# Patient Record
Sex: FEMALE | Race: OTHER | NOT HISPANIC OR LATINO | Employment: FULL TIME | RURAL
[De-identification: names, ages, dates, MRNs, and addresses within clinical notes are randomized per-mention and may not be internally consistent; named-entity substitution may affect disease eponyms.]

---

## 2021-05-25 DIAGNOSIS — G43.911 MIGRAINE, UNSPECIFIED, INTRACTABLE, WITH STATUS MIGRAINOSUS: Primary | ICD-10-CM

## 2021-06-17 ENCOUNTER — OFFICE VISIT (OUTPATIENT)
Dept: NEUROLOGY | Facility: CLINIC | Age: 51
End: 2021-06-17
Payer: COMMERCIAL

## 2021-06-17 VITALS
WEIGHT: 197.19 LBS | DIASTOLIC BLOOD PRESSURE: 90 MMHG | HEIGHT: 66 IN | OXYGEN SATURATION: 99 % | BODY MASS INDEX: 31.69 KG/M2 | HEART RATE: 83 BPM | SYSTOLIC BLOOD PRESSURE: 126 MMHG

## 2021-06-17 DIAGNOSIS — G43.019 INTRACTABLE MIGRAINE WITHOUT AURA AND WITHOUT STATUS MIGRAINOSUS: Primary | ICD-10-CM

## 2021-06-17 DIAGNOSIS — E55.9 VITAMIN D DEFICIENCY: ICD-10-CM

## 2021-06-17 DIAGNOSIS — G47.30 SLEEP APNEA, UNSPECIFIED TYPE: ICD-10-CM

## 2021-06-17 PROCEDURE — 99203 PR OFFICE/OUTPT VISIT, NEW, LEVL III, 30-44 MIN: ICD-10-PCS | Mod: S$PBB,,, | Performed by: NURSE PRACTITIONER

## 2021-06-17 PROCEDURE — 3008F PR BODY MASS INDEX (BMI) DOCUMENTED: ICD-10-PCS | Mod: ,,, | Performed by: NURSE PRACTITIONER

## 2021-06-17 PROCEDURE — 99203 OFFICE O/P NEW LOW 30 MIN: CPT | Mod: S$PBB,,, | Performed by: NURSE PRACTITIONER

## 2021-06-17 PROCEDURE — 99215 OFFICE O/P EST HI 40 MIN: CPT | Mod: PBBFAC | Performed by: NURSE PRACTITIONER

## 2021-06-17 PROCEDURE — 3008F BODY MASS INDEX DOCD: CPT | Mod: ,,, | Performed by: NURSE PRACTITIONER

## 2021-06-17 RX ORDER — ERGOCALCIFEROL 1.25 MG/1
CAPSULE ORAL
Qty: 12 CAPSULE | Refills: 1 | Status: SHIPPED | OUTPATIENT
Start: 2021-06-17 | End: 2021-07-29 | Stop reason: SDUPTHER

## 2021-06-17 RX ORDER — AMLODIPINE BESYLATE 10 MG/1
10 TABLET ORAL DAILY
COMMUNITY
Start: 2021-06-07 | End: 2021-07-29 | Stop reason: SDUPTHER

## 2021-06-17 RX ORDER — FEXOFENADINE HCL 60 MG
60 TABLET ORAL DAILY
COMMUNITY
End: 2021-07-29 | Stop reason: SDUPTHER

## 2021-06-17 RX ORDER — ASPIRIN 81 MG/1
81 TABLET ORAL DAILY
COMMUNITY
Start: 2021-02-26 | End: 2021-10-06 | Stop reason: SDUPTHER

## 2021-06-17 RX ORDER — TOPIRAMATE 50 MG/1
TABLET, FILM COATED ORAL
COMMUNITY
Start: 2021-01-28 | End: 2021-06-17 | Stop reason: SDUPTHER

## 2021-06-17 RX ORDER — SUMATRIPTAN SUCCINATE 100 MG/1
TABLET ORAL
Qty: 12 TABLET | Refills: 3 | Status: SHIPPED | OUTPATIENT
Start: 2021-06-17 | End: 2021-09-20 | Stop reason: SDUPTHER

## 2021-06-17 RX ORDER — CETIRIZINE HYDROCHLORIDE 10 MG/1
10 TABLET ORAL DAILY
COMMUNITY
End: 2021-07-29 | Stop reason: SDUPTHER

## 2021-06-17 RX ORDER — SUMATRIPTAN 50 MG/1
TABLET, FILM COATED ORAL
COMMUNITY
Start: 2021-01-28 | End: 2021-06-17 | Stop reason: DRUGHIGH

## 2021-06-17 RX ORDER — CYCLOBENZAPRINE HCL 10 MG
10 TABLET ORAL 3 TIMES DAILY PRN
COMMUNITY
Start: 2021-04-29 | End: 2021-07-29 | Stop reason: SDUPTHER

## 2021-06-17 RX ORDER — HYDROCHLOROTHIAZIDE 12.5 MG/1
12.5 TABLET ORAL DAILY
COMMUNITY
Start: 2021-06-07 | End: 2021-07-29 | Stop reason: SDUPTHER

## 2021-06-17 RX ORDER — TOPIRAMATE 50 MG/1
TABLET, FILM COATED ORAL
Qty: 60 TABLET | Refills: 3 | Status: SHIPPED | OUTPATIENT
Start: 2021-06-17 | End: 2021-09-20 | Stop reason: DRUGHIGH

## 2021-06-17 RX ORDER — PRAVASTATIN SODIUM 10 MG/1
TABLET ORAL
COMMUNITY
Start: 2021-01-24 | End: 2021-07-29 | Stop reason: SDUPTHER

## 2021-07-06 ENCOUNTER — TELEPHONE (OUTPATIENT)
Dept: NEUROLOGY | Facility: CLINIC | Age: 51
End: 2021-07-06

## 2021-07-21 DIAGNOSIS — G47.30 SLEEP APNEA, UNSPECIFIED: Primary | ICD-10-CM

## 2021-07-29 ENCOUNTER — OFFICE VISIT (OUTPATIENT)
Dept: FAMILY MEDICINE | Facility: CLINIC | Age: 51
End: 2021-07-29
Payer: COMMERCIAL

## 2021-07-29 VITALS
SYSTOLIC BLOOD PRESSURE: 114 MMHG | WEIGHT: 200 LBS | TEMPERATURE: 97 F | HEIGHT: 66 IN | BODY MASS INDEX: 32.14 KG/M2 | DIASTOLIC BLOOD PRESSURE: 78 MMHG | HEART RATE: 87 BPM | RESPIRATION RATE: 20 BRPM

## 2021-07-29 DIAGNOSIS — I10 HYPERTENSION, UNSPECIFIED TYPE: Primary | ICD-10-CM

## 2021-07-29 DIAGNOSIS — J30.89 ALLERGIC RHINITIS DUE TO OTHER ALLERGIC TRIGGER, UNSPECIFIED SEASONALITY: ICD-10-CM

## 2021-07-29 DIAGNOSIS — E78.5 HYPERLIPIDEMIA, UNSPECIFIED HYPERLIPIDEMIA TYPE: ICD-10-CM

## 2021-07-29 DIAGNOSIS — E55.9 VITAMIN D DEFICIENCY: ICD-10-CM

## 2021-07-29 PROBLEM — J30.9 ALLERGIC RHINITIS: Status: ACTIVE | Noted: 2021-07-29

## 2021-07-29 LAB
ALBUMIN SERPL BCP-MCNC: 3.6 G/DL (ref 3.5–5)
ALBUMIN/GLOB SERPL: 0.9 {RATIO}
ALP SERPL-CCNC: 74 U/L (ref 41–108)
ALT SERPL W P-5'-P-CCNC: 52 U/L (ref 13–56)
ANION GAP SERPL CALCULATED.3IONS-SCNC: 9 MMOL/L (ref 7–16)
AST SERPL W P-5'-P-CCNC: 36 U/L (ref 15–37)
BILIRUB SERPL-MCNC: 0.1 MG/DL (ref 0–1.2)
BUN SERPL-MCNC: 15 MG/DL (ref 7–18)
BUN/CREAT SERPL: 16 (ref 6–20)
CALCIUM SERPL-MCNC: 9.3 MG/DL (ref 8.5–10.1)
CHLORIDE SERPL-SCNC: 105 MMOL/L (ref 98–107)
CHOLEST SERPL-MCNC: 174 MG/DL (ref 0–200)
CHOLEST/HDLC SERPL: 4 {RATIO}
CO2 SERPL-SCNC: 29 MMOL/L (ref 21–32)
CREAT SERPL-MCNC: 0.95 MG/DL (ref 0.55–1.02)
GLOBULIN SER-MCNC: 4.2 G/DL (ref 2–4)
GLUCOSE SERPL-MCNC: 78 MG/DL (ref 74–106)
HDLC SERPL-MCNC: 43 MG/DL (ref 40–60)
LDLC SERPL CALC-MCNC: 108 MG/DL
LDLC/HDLC SERPL: 2.5 {RATIO}
NONHDLC SERPL-MCNC: 131 MG/DL
POTASSIUM SERPL-SCNC: 3.8 MMOL/L (ref 3.5–5.1)
PROT SERPL-MCNC: 7.8 G/DL (ref 6.4–8.2)
SODIUM SERPL-SCNC: 139 MMOL/L (ref 136–145)
TRIGL SERPL-MCNC: 115 MG/DL (ref 35–150)
VLDLC SERPL-MCNC: 23 MG/DL

## 2021-07-29 PROCEDURE — 80053 COMPREHEN METABOLIC PANEL: CPT | Mod: ,,, | Performed by: CLINICAL MEDICAL LABORATORY

## 2021-07-29 PROCEDURE — 80061 LIPID PANEL: CPT | Mod: ,,, | Performed by: CLINICAL MEDICAL LABORATORY

## 2021-07-29 PROCEDURE — 3074F PR MOST RECENT SYSTOLIC BLOOD PRESSURE < 130 MM HG: ICD-10-PCS | Mod: CPTII,,, | Performed by: NURSE PRACTITIONER

## 2021-07-29 PROCEDURE — 3078F PR MOST RECENT DIASTOLIC BLOOD PRESSURE < 80 MM HG: ICD-10-PCS | Mod: CPTII,,, | Performed by: NURSE PRACTITIONER

## 2021-07-29 PROCEDURE — 3074F SYST BP LT 130 MM HG: CPT | Mod: CPTII,,, | Performed by: NURSE PRACTITIONER

## 2021-07-29 PROCEDURE — 1159F PR MEDICATION LIST DOCUMENTED IN MEDICAL RECORD: ICD-10-PCS | Mod: CPTII,,, | Performed by: NURSE PRACTITIONER

## 2021-07-29 PROCEDURE — 3008F BODY MASS INDEX DOCD: CPT | Mod: CPTII,,, | Performed by: NURSE PRACTITIONER

## 2021-07-29 PROCEDURE — 99213 OFFICE O/P EST LOW 20 MIN: CPT | Mod: ,,, | Performed by: NURSE PRACTITIONER

## 2021-07-29 PROCEDURE — 3078F DIAST BP <80 MM HG: CPT | Mod: CPTII,,, | Performed by: NURSE PRACTITIONER

## 2021-07-29 PROCEDURE — 99213 PR OFFICE/OUTPT VISIT, EST, LEVL III, 20-29 MIN: ICD-10-PCS | Mod: ,,, | Performed by: NURSE PRACTITIONER

## 2021-07-29 PROCEDURE — 3044F PR MOST RECENT HEMOGLOBIN A1C LEVEL <7.0%: ICD-10-PCS | Mod: CPTII,,, | Performed by: NURSE PRACTITIONER

## 2021-07-29 PROCEDURE — 80053 COMPREHENSIVE METABOLIC PANEL: ICD-10-PCS | Mod: ,,, | Performed by: CLINICAL MEDICAL LABORATORY

## 2021-07-29 PROCEDURE — 1159F MED LIST DOCD IN RCRD: CPT | Mod: CPTII,,, | Performed by: NURSE PRACTITIONER

## 2021-07-29 PROCEDURE — 3044F HG A1C LEVEL LT 7.0%: CPT | Mod: CPTII,,, | Performed by: NURSE PRACTITIONER

## 2021-07-29 PROCEDURE — 80061 LIPID PANEL: ICD-10-PCS | Mod: ,,, | Performed by: CLINICAL MEDICAL LABORATORY

## 2021-07-29 PROCEDURE — 3008F PR BODY MASS INDEX (BMI) DOCUMENTED: ICD-10-PCS | Mod: CPTII,,, | Performed by: NURSE PRACTITIONER

## 2021-07-29 RX ORDER — ERGOCALCIFEROL 1.25 MG/1
CAPSULE ORAL
Qty: 12 CAPSULE | Refills: 1 | Status: SHIPPED | OUTPATIENT
Start: 2021-07-29 | End: 2022-10-11 | Stop reason: SDUPTHER

## 2021-07-29 RX ORDER — FEXOFENADINE HCL 60 MG
60 TABLET ORAL DAILY
Qty: 90 TABLET | Refills: 1 | Status: SHIPPED | OUTPATIENT
Start: 2021-07-29 | End: 2022-10-11 | Stop reason: SDUPTHER

## 2021-07-29 RX ORDER — AMLODIPINE BESYLATE 10 MG/1
10 TABLET ORAL DAILY
Qty: 90 TABLET | Refills: 1 | Status: SHIPPED | OUTPATIENT
Start: 2021-07-29 | End: 2022-10-11 | Stop reason: SDUPTHER

## 2021-07-29 RX ORDER — CYCLOBENZAPRINE HCL 10 MG
10 TABLET ORAL 3 TIMES DAILY PRN
Qty: 45 TABLET | Refills: 1 | Status: SHIPPED | OUTPATIENT
Start: 2021-07-29 | End: 2023-08-15

## 2021-07-29 RX ORDER — HYDROCHLOROTHIAZIDE 12.5 MG/1
12.5 TABLET ORAL DAILY
Qty: 90 TABLET | Refills: 1 | Status: SHIPPED | OUTPATIENT
Start: 2021-07-29 | End: 2022-10-11 | Stop reason: SDUPTHER

## 2021-07-29 RX ORDER — PRAVASTATIN SODIUM 10 MG/1
10 TABLET ORAL DAILY
Qty: 90 TABLET | Refills: 1 | Status: SHIPPED | OUTPATIENT
Start: 2021-07-29 | End: 2022-10-11 | Stop reason: SDUPTHER

## 2021-07-29 RX ORDER — CETIRIZINE HYDROCHLORIDE 10 MG/1
10 TABLET ORAL DAILY
Qty: 90 TABLET | Refills: 1 | Status: SHIPPED | OUTPATIENT
Start: 2021-07-29 | End: 2022-10-11 | Stop reason: SDUPTHER

## 2021-08-11 ENCOUNTER — TELEPHONE (OUTPATIENT)
Dept: FAMILY MEDICINE | Facility: CLINIC | Age: 51
End: 2021-08-11

## 2021-08-16 ENCOUNTER — PROCEDURE VISIT (OUTPATIENT)
Dept: SLEEP MEDICINE | Facility: HOSPITAL | Age: 51
End: 2021-08-16
Payer: COMMERCIAL

## 2021-08-16 DIAGNOSIS — G47.30 SLEEP APNEA, UNSPECIFIED: ICD-10-CM

## 2021-08-16 PROCEDURE — 95806 SLEEP STUDY UNATT&RESP EFFT: CPT | Mod: PO

## 2021-08-18 DIAGNOSIS — G47.30 SLEEP APNEA, UNSPECIFIED: Primary | ICD-10-CM

## 2021-09-20 ENCOUNTER — OFFICE VISIT (OUTPATIENT)
Dept: NEUROLOGY | Facility: CLINIC | Age: 51
End: 2021-09-20
Payer: COMMERCIAL

## 2021-09-20 VITALS
WEIGHT: 194.63 LBS | HEIGHT: 65 IN | HEART RATE: 77 BPM | DIASTOLIC BLOOD PRESSURE: 84 MMHG | OXYGEN SATURATION: 98 % | SYSTOLIC BLOOD PRESSURE: 122 MMHG | BODY MASS INDEX: 32.43 KG/M2

## 2021-09-20 DIAGNOSIS — G43.019 INTRACTABLE MIGRAINE WITHOUT AURA AND WITHOUT STATUS MIGRAINOSUS: Primary | ICD-10-CM

## 2021-09-20 DIAGNOSIS — G47.39 OTHER SLEEP APNEA: ICD-10-CM

## 2021-09-20 DIAGNOSIS — E55.9 VITAMIN D DEFICIENCY: ICD-10-CM

## 2021-09-20 PROBLEM — G47.30 SLEEP APNEA: Status: RESOLVED | Noted: 2021-09-20 | Resolved: 2021-09-20

## 2021-09-20 PROBLEM — G47.30 SLEEP APNEA: Status: ACTIVE | Noted: 2021-09-20

## 2021-09-20 PROCEDURE — 3044F HG A1C LEVEL LT 7.0%: CPT | Mod: ,,, | Performed by: NURSE PRACTITIONER

## 2021-09-20 PROCEDURE — 3008F BODY MASS INDEX DOCD: CPT | Mod: ,,, | Performed by: NURSE PRACTITIONER

## 2021-09-20 PROCEDURE — 3079F DIAST BP 80-89 MM HG: CPT | Mod: ,,, | Performed by: NURSE PRACTITIONER

## 2021-09-20 PROCEDURE — 3074F SYST BP LT 130 MM HG: CPT | Mod: ,,, | Performed by: NURSE PRACTITIONER

## 2021-09-20 PROCEDURE — 1160F PR REVIEW ALL MEDS BY PRESCRIBER/CLIN PHARMACIST DOCUMENTED: ICD-10-PCS | Mod: ,,, | Performed by: NURSE PRACTITIONER

## 2021-09-20 PROCEDURE — 1159F MED LIST DOCD IN RCRD: CPT | Mod: ,,, | Performed by: NURSE PRACTITIONER

## 2021-09-20 PROCEDURE — 3066F NEPHROPATHY DOC TX: CPT | Mod: ,,, | Performed by: NURSE PRACTITIONER

## 2021-09-20 PROCEDURE — 3066F PR DOCUMENTATION OF TREATMENT FOR NEPHROPATHY: ICD-10-PCS | Mod: ,,, | Performed by: NURSE PRACTITIONER

## 2021-09-20 PROCEDURE — 1159F PR MEDICATION LIST DOCUMENTED IN MEDICAL RECORD: ICD-10-PCS | Mod: ,,, | Performed by: NURSE PRACTITIONER

## 2021-09-20 PROCEDURE — 3061F NEG MICROALBUMINURIA REV: CPT | Mod: ,,, | Performed by: NURSE PRACTITIONER

## 2021-09-20 PROCEDURE — 99214 OFFICE O/P EST MOD 30 MIN: CPT | Mod: PBBFAC | Performed by: NURSE PRACTITIONER

## 2021-09-20 PROCEDURE — 99213 OFFICE O/P EST LOW 20 MIN: CPT | Mod: S$PBB,,, | Performed by: NURSE PRACTITIONER

## 2021-09-20 PROCEDURE — 99213 PR OFFICE/OUTPT VISIT, EST, LEVL III, 20-29 MIN: ICD-10-PCS | Mod: S$PBB,,, | Performed by: NURSE PRACTITIONER

## 2021-09-20 PROCEDURE — 3074F PR MOST RECENT SYSTOLIC BLOOD PRESSURE < 130 MM HG: ICD-10-PCS | Mod: ,,, | Performed by: NURSE PRACTITIONER

## 2021-09-20 PROCEDURE — 3079F PR MOST RECENT DIASTOLIC BLOOD PRESSURE 80-89 MM HG: ICD-10-PCS | Mod: ,,, | Performed by: NURSE PRACTITIONER

## 2021-09-20 PROCEDURE — 3044F PR MOST RECENT HEMOGLOBIN A1C LEVEL <7.0%: ICD-10-PCS | Mod: ,,, | Performed by: NURSE PRACTITIONER

## 2021-09-20 PROCEDURE — 1160F RVW MEDS BY RX/DR IN RCRD: CPT | Mod: ,,, | Performed by: NURSE PRACTITIONER

## 2021-09-20 PROCEDURE — 3008F PR BODY MASS INDEX (BMI) DOCUMENTED: ICD-10-PCS | Mod: ,,, | Performed by: NURSE PRACTITIONER

## 2021-09-20 PROCEDURE — 3061F PR NEG MICROALBUMINURIA RESULT DOCUMENTED/REVIEW: ICD-10-PCS | Mod: ,,, | Performed by: NURSE PRACTITIONER

## 2021-09-20 RX ORDER — TOPIRAMATE 100 MG/1
TABLET, FILM COATED ORAL
Qty: 30 TABLET | Refills: 3 | Status: ON HOLD | OUTPATIENT
Start: 2021-09-20 | End: 2022-12-01 | Stop reason: ALTCHOICE

## 2021-09-20 RX ORDER — SUMATRIPTAN SUCCINATE 100 MG/1
TABLET ORAL
Qty: 12 TABLET | Refills: 3 | Status: ON HOLD | OUTPATIENT
Start: 2021-09-20 | End: 2022-12-01 | Stop reason: ALTCHOICE

## 2021-10-06 ENCOUNTER — OFFICE VISIT (OUTPATIENT)
Dept: FAMILY MEDICINE | Facility: CLINIC | Age: 51
End: 2021-10-06
Payer: COMMERCIAL

## 2021-10-06 VITALS
SYSTOLIC BLOOD PRESSURE: 107 MMHG | RESPIRATION RATE: 18 BRPM | BODY MASS INDEX: 32.49 KG/M2 | HEART RATE: 76 BPM | HEIGHT: 65 IN | DIASTOLIC BLOOD PRESSURE: 72 MMHG | TEMPERATURE: 97 F | WEIGHT: 195 LBS

## 2021-10-06 DIAGNOSIS — I10 HYPERTENSION, UNSPECIFIED TYPE: Primary | ICD-10-CM

## 2021-10-06 DIAGNOSIS — E78.5 HYPERLIPIDEMIA, UNSPECIFIED HYPERLIPIDEMIA TYPE: ICD-10-CM

## 2021-10-06 LAB
ALBUMIN SERPL BCP-MCNC: 3.8 G/DL (ref 3.5–5)
ALBUMIN/GLOB SERPL: 0.8 {RATIO}
ALP SERPL-CCNC: 75 U/L (ref 41–108)
ALT SERPL W P-5'-P-CCNC: 29 U/L (ref 13–56)
ANION GAP SERPL CALCULATED.3IONS-SCNC: 10 MMOL/L (ref 7–16)
AST SERPL W P-5'-P-CCNC: 19 U/L (ref 15–37)
BILIRUB SERPL-MCNC: 0.2 MG/DL (ref 0–1.2)
BUN SERPL-MCNC: 11 MG/DL (ref 7–18)
BUN/CREAT SERPL: 12 (ref 6–20)
CALCIUM SERPL-MCNC: 9.7 MG/DL (ref 8.5–10.1)
CHLORIDE SERPL-SCNC: 104 MMOL/L (ref 98–107)
CHOLEST SERPL-MCNC: 162 MG/DL (ref 0–200)
CHOLEST/HDLC SERPL: 3.4 {RATIO}
CO2 SERPL-SCNC: 30 MMOL/L (ref 21–32)
CREAT SERPL-MCNC: 0.92 MG/DL (ref 0.55–1.02)
GLOBULIN SER-MCNC: 4.8 G/DL (ref 2–4)
GLUCOSE SERPL-MCNC: 106 MG/DL (ref 74–106)
HDLC SERPL-MCNC: 48 MG/DL (ref 40–60)
LDLC SERPL CALC-MCNC: 93 MG/DL
LDLC/HDLC SERPL: 1.9 {RATIO}
NONHDLC SERPL-MCNC: 114 MG/DL
POTASSIUM SERPL-SCNC: 3.6 MMOL/L (ref 3.5–5.1)
PROT SERPL-MCNC: 8.6 G/DL (ref 6.4–8.2)
SODIUM SERPL-SCNC: 140 MMOL/L (ref 136–145)
TRIGL SERPL-MCNC: 105 MG/DL (ref 35–150)
VLDLC SERPL-MCNC: 21 MG/DL

## 2021-10-06 PROCEDURE — 3044F PR MOST RECENT HEMOGLOBIN A1C LEVEL <7.0%: ICD-10-PCS | Mod: CPTII,,, | Performed by: NURSE PRACTITIONER

## 2021-10-06 PROCEDURE — 99213 PR OFFICE/OUTPT VISIT, EST, LEVL III, 20-29 MIN: ICD-10-PCS | Mod: ,,, | Performed by: NURSE PRACTITIONER

## 2021-10-06 PROCEDURE — 3078F DIAST BP <80 MM HG: CPT | Mod: CPTII,,, | Performed by: NURSE PRACTITIONER

## 2021-10-06 PROCEDURE — 80061 LIPID PANEL: CPT | Mod: ,,, | Performed by: CLINICAL MEDICAL LABORATORY

## 2021-10-06 PROCEDURE — 3061F PR NEG MICROALBUMINURIA RESULT DOCUMENTED/REVIEW: ICD-10-PCS | Mod: CPTII,,, | Performed by: NURSE PRACTITIONER

## 2021-10-06 PROCEDURE — 3074F SYST BP LT 130 MM HG: CPT | Mod: CPTII,,, | Performed by: NURSE PRACTITIONER

## 2021-10-06 PROCEDURE — 3078F PR MOST RECENT DIASTOLIC BLOOD PRESSURE < 80 MM HG: ICD-10-PCS | Mod: CPTII,,, | Performed by: NURSE PRACTITIONER

## 2021-10-06 PROCEDURE — 3008F PR BODY MASS INDEX (BMI) DOCUMENTED: ICD-10-PCS | Mod: CPTII,,, | Performed by: NURSE PRACTITIONER

## 2021-10-06 PROCEDURE — 80053 COMPREHENSIVE METABOLIC PANEL: ICD-10-PCS | Mod: ,,, | Performed by: CLINICAL MEDICAL LABORATORY

## 2021-10-06 PROCEDURE — 3061F NEG MICROALBUMINURIA REV: CPT | Mod: CPTII,,, | Performed by: NURSE PRACTITIONER

## 2021-10-06 PROCEDURE — 99213 OFFICE O/P EST LOW 20 MIN: CPT | Mod: ,,, | Performed by: NURSE PRACTITIONER

## 2021-10-06 PROCEDURE — 3008F BODY MASS INDEX DOCD: CPT | Mod: CPTII,,, | Performed by: NURSE PRACTITIONER

## 2021-10-06 PROCEDURE — 1159F MED LIST DOCD IN RCRD: CPT | Mod: CPTII,,, | Performed by: NURSE PRACTITIONER

## 2021-10-06 PROCEDURE — 80061 LIPID PANEL: ICD-10-PCS | Mod: ,,, | Performed by: CLINICAL MEDICAL LABORATORY

## 2021-10-06 PROCEDURE — 1159F PR MEDICATION LIST DOCUMENTED IN MEDICAL RECORD: ICD-10-PCS | Mod: CPTII,,, | Performed by: NURSE PRACTITIONER

## 2021-10-06 PROCEDURE — 3074F PR MOST RECENT SYSTOLIC BLOOD PRESSURE < 130 MM HG: ICD-10-PCS | Mod: CPTII,,, | Performed by: NURSE PRACTITIONER

## 2021-10-06 PROCEDURE — 3066F NEPHROPATHY DOC TX: CPT | Mod: CPTII,,, | Performed by: NURSE PRACTITIONER

## 2021-10-06 PROCEDURE — 80053 COMPREHEN METABOLIC PANEL: CPT | Mod: ,,, | Performed by: CLINICAL MEDICAL LABORATORY

## 2021-10-06 PROCEDURE — 3066F PR DOCUMENTATION OF TREATMENT FOR NEPHROPATHY: ICD-10-PCS | Mod: CPTII,,, | Performed by: NURSE PRACTITIONER

## 2021-10-06 PROCEDURE — 3044F HG A1C LEVEL LT 7.0%: CPT | Mod: CPTII,,, | Performed by: NURSE PRACTITIONER

## 2021-10-06 RX ORDER — ASPIRIN 81 MG/1
81 TABLET ORAL DAILY
Qty: 90 TABLET | Refills: 1 | Status: SHIPPED | OUTPATIENT
Start: 2021-10-06 | End: 2022-10-11 | Stop reason: SDUPTHER

## 2021-10-12 ENCOUNTER — PROCEDURE VISIT (OUTPATIENT)
Dept: SLEEP MEDICINE | Facility: HOSPITAL | Age: 51
End: 2021-10-12
Attending: INTERNAL MEDICINE
Payer: COMMERCIAL

## 2021-10-12 DIAGNOSIS — G47.30 SLEEP APNEA, UNSPECIFIED: ICD-10-CM

## 2021-10-12 PROCEDURE — 95810 POLYSOM 6/> YRS 4/> PARAM: CPT | Mod: PO

## 2021-11-19 LAB — PAP RECOMMENDATION EXT: NORMAL

## 2022-02-01 ENCOUNTER — PATIENT MESSAGE (OUTPATIENT)
Dept: FAMILY MEDICINE | Facility: CLINIC | Age: 52
End: 2022-02-01
Payer: COMMERCIAL

## 2022-02-01 DIAGNOSIS — Z12.11 SCREENING FOR COLON CANCER: Primary | ICD-10-CM

## 2022-02-09 ENCOUNTER — PATIENT OUTREACH (OUTPATIENT)
Dept: ADMINISTRATIVE | Facility: HOSPITAL | Age: 52
End: 2022-02-09

## 2022-02-21 ENCOUNTER — PATIENT MESSAGE (OUTPATIENT)
Dept: FAMILY MEDICINE | Facility: CLINIC | Age: 52
End: 2022-02-21
Payer: COMMERCIAL

## 2022-03-06 ENCOUNTER — PATIENT MESSAGE (OUTPATIENT)
Dept: FAMILY MEDICINE | Facility: CLINIC | Age: 52
End: 2022-03-06
Payer: COMMERCIAL

## 2022-03-06 DIAGNOSIS — Z12.11 SCREENING FOR COLON CANCER: Primary | ICD-10-CM

## 2022-03-20 ENCOUNTER — PATIENT MESSAGE (OUTPATIENT)
Dept: FAMILY MEDICINE | Facility: CLINIC | Age: 52
End: 2022-03-20
Payer: COMMERCIAL

## 2022-03-20 DIAGNOSIS — Z12.39 ENCOUNTER FOR SCREENING FOR MALIGNANT NEOPLASM OF BREAST, UNSPECIFIED SCREENING MODALITY: Primary | ICD-10-CM

## 2022-03-31 ENCOUNTER — TELEPHONE (OUTPATIENT)
Dept: FAMILY MEDICINE | Facility: CLINIC | Age: 52
End: 2022-03-31
Payer: COMMERCIAL

## 2022-03-31 NOTE — TELEPHONE ENCOUNTER
----- Message from Brigid Dowell sent at 3/31/2022 12:29 PM CDT -----    ----- Message -----  From: TAMMY Coleman  Sent: 3/31/2022  12:23 PM CDT  To: Brigid Dowell    Please send to [facility] or [email or web address], they do referrals  ----- Message -----  From: Brigid Dowell  Sent: 3/29/2022   1:40 PM CDT  To: Juan C Marte RN    Need's a referral fax because there wasn't a reason listed on the referral as to what her appointment was for.

## 2022-04-01 LAB — BCS RECOMMENDATION EXT: NORMAL

## 2022-04-06 ENCOUNTER — PATIENT OUTREACH (OUTPATIENT)
Dept: FAMILY MEDICINE | Facility: CLINIC | Age: 52
End: 2022-04-06
Payer: COMMERCIAL

## 2022-05-19 ENCOUNTER — PATIENT OUTREACH (OUTPATIENT)
Dept: FAMILY MEDICINE | Facility: CLINIC | Age: 52
End: 2022-05-19
Payer: COMMERCIAL

## 2022-10-07 NOTE — PROGRESS NOTES
Sravani Montague NP   [address] [place] [address]     PATIENT NAME: Sandra Pérez  : 1970  DATE: 10/11/22  MRN: [id number]      Billing Provider: Sravani Montague NP  Level of Service: OR OFFICE/OUTPT VISIT, EST, LEVL III, 20-29 MIN  Patient PCP Information       Provider PCP Type    TAMMY Coleman General            Reason for Visit / Chief Complaint: Hypertension       Update PCP  Update Chief Complaint         History of Present Illness / Problem Focused Workflow     Sandra Pérez presents to the clinic with Hypertension     Patient is a 52 year old female to the clinic for annual exam. She is due labs and medication refills. Mammogram is UTD 2022; PAP is UTD 2021. Routine Eye Exam [facility] 2022.     She reports she has been out of her Amlodipine and HCTZ for the past 1-2 weeks. She also states she has been taking her BP medications every other day instead of daily since being prescribed these medications. Refilled medications and encouraged her to take daily and recheck BP in one week.     Hypertension  This is a recurrent problem. The current episode started more than 1 year ago. The problem is controlled. Associated symptoms include headaches. Pertinent negatives include no anxiety, blurred vision, chest pain, malaise/fatigue, neck pain, orthopnea, palpitations, peripheral edema, PND, shortness of breath or sweats. There are no associated agents to hypertension. There are no known risk factors for coronary artery disease. Past treatments include nothing. The current treatment provides mild improvement. Compliance problems include diet and exercise.      Review of Systems     Review of Systems   Constitutional:  Negative for malaise/fatigue.   Eyes:  Negative for blurred vision.   Respiratory:  Negative for shortness of breath.    Cardiovascular:  Negative for chest pain, palpitations, orthopnea and PND.   Musculoskeletal:   Negative for neck pain.   Neurological:  Positive for headaches.   All other systems reviewed and are negative.     Medical / Social / Family History     Past Medical History:   Diagnosis Date    Hyperlipemia     Hypertension     Pulmonary edema        Past Surgical History:   Procedure Laterality Date    BILATERAL TUBAL LIGATION      BUNIONECTOMY      CHOLECYSTECTOMY         Social History  Ms.  reports that she has never smoked. She has never used smokeless tobacco. She reports current alcohol use. She reports that she does not use drugs.    Family History  Ms.'s family history includes Diabetes in her mother; Hypertension in her mother and sister; No Known Problems in her father.    Medications and Allergies     Medications  Outpatient Medications Marked as Taking for the 10/11/22 encounter (Office Visit) with Sravani oMntague NP   Medication Sig Dispense Refill    cyclobenzaprine (FLEXERIL) 10 MG tablet Take 1 tablet (10 mg total) by mouth 3 (three) times daily as needed. 45 tablet 1    [DISCONTINUED] amLODIPine (NORVASC) 10 MG tablet Take 1 tablet (10 mg total) by mouth once daily. 90 tablet 1    [DISCONTINUED] aspirin (ECOTRIN) 81 MG EC tablet Take 1 tablet (81 mg total) by mouth once daily. 90 tablet 1    [DISCONTINUED] cetirizine (ZYRTEC) 10 MG tablet Take 1 tablet (10 mg total) by mouth once daily. 90 tablet 1    [DISCONTINUED] ergocalciferol (ERGOCALCIFEROL) 50,000 unit Cap Take one capsule weekly for 12 weeks then reduce to one capsule monthly 12 capsule 1    [DISCONTINUED] fexofenadine (ALLEGRA ALLERGY) 60 MG tablet Take 1 tablet (60 mg total) by mouth once daily. 90 tablet 1    [DISCONTINUED] fluticasone propionate (FLONASE) 50 mcg/actuation nasal spray 2 sprays by Each Nostril route 2 (two) times daily.      [DISCONTINUED] hydroCHLOROthiazide (HYDRODIURIL) 12.5 MG Tab Take 1 tablet (12.5 mg total) by mouth once daily. 90 tablet 1    [DISCONTINUED] pravastatin (PRAVACHOL) 10 MG tablet Take 1  "tablet (10 mg total) by mouth once daily. 90 tablet 1       Allergies  Review of patient's allergies indicates:   Allergen Reactions    Shellfish containing products Edema    Lortab [hydrocodone-acetaminophen] Nausea And Vomiting       Physical Examination   BP (!) 162/110 (BP Location: Left arm, Patient Position: Sitting, BP Method: Medium (Automatic))   Pulse 94   Temp 97.7 °F (36.5 °C) (Temporal)   Resp 18   Ht 5' 5" (1.651 m)   Wt 88.4 kg (194 lb 12.8 oz)   BMI 32.42 kg/m²    Physical Exam  Vitals and nursing note reviewed.   Constitutional:       Appearance: Normal appearance.   HENT:      Head: Normocephalic.      Right Ear: Tympanic membrane normal.      Left Ear: Tympanic membrane normal.      Nose: Nose normal.      Mouth/Throat:      Mouth: Mucous membranes are moist.      Pharynx: Oropharynx is clear.   Eyes:      Conjunctiva/sclera: Conjunctivae normal.      Pupils: Pupils are equal, round, and reactive to light.   Cardiovascular:      Rate and Rhythm: Normal rate and regular rhythm.      Pulses: Normal pulses.      Heart sounds: Normal heart sounds.   Pulmonary:      Effort: Pulmonary effort is normal.      Breath sounds: Normal breath sounds.   Abdominal:      General: Bowel sounds are normal.      Palpations: Abdomen is soft.   Musculoskeletal:         General: Normal range of motion.      Cervical back: Normal range of motion and neck supple.   Skin:     General: Skin is warm.      Capillary Refill: Capillary refill takes less than 2 seconds.   Neurological:      General: No focal deficit present.      Mental Status: She is alert and oriented to person, place, and time.   Psychiatric:         Mood and Affect: Mood normal.         Thought Content: Thought content normal.        Assessment and Plan (including Health Maintenance)      Problem List  Smart Sets  Document Outside HM   :    Plan:     Mammo -- UTD, 04/2022     Colonoscopy -- due now, will add referral    Routine Eye Exam -- UTD " 06/2022, will request records from [facility]    PAP 11/2021 -- due again 11/2024    Check labs today and refill medications    Patient has experienced pain in the right knee -- she has been seen at Urgent care as well as chiropractor and has had multiple Xrays completed. She request referral to Ortho for further evaluation as she is still experiencing pain.     Health Maintenance Due   Topic Date Due    Hepatitis C Screening  Never done    COVID-19 Vaccine (1) Never done    HIV Screening  Never done    TETANUS VACCINE  Never done    Colorectal Cancer Screening  Never done    Shingles Vaccine (1 of 2) Never done    Influenza Vaccine (1) Never done       Problem List Items Addressed This Visit          ENT    Allergic rhinitis    Relevant Medications    cetirizine (ZYRTEC) 10 MG tablet    fexofenadine (ALLEGRA ALLERGY) 60 MG tablet    fluticasone propionate (FLONASE) 50 mcg/actuation nasal spray       Cardiac/Vascular    Hyperlipidemia    Relevant Medications    pravastatin (PRAVACHOL) 10 MG tablet    Other Relevant Orders    Lipid Panel    Hypertension - Primary    Relevant Medications    amLODIPine (NORVASC) 10 MG tablet    aspirin (ECOTRIN) 81 MG EC tablet    hydroCHLOROthiazide (HYDRODIURIL) 12.5 MG Tab    Other Relevant Orders    CBC Auto Differential    Comprehensive Metabolic Panel       ID    Screening for viral disease    Relevant Orders    HIV 1/2 Ag/Ab (4th Gen)    Hepatitis C Antibody       Endocrine    Vitamin D deficiency    Relevant Medications    ergocalciferol (ERGOCALCIFEROL) 50,000 unit Cap    Other Relevant Orders    Vitamin D    Impaired fasting blood sugar    Relevant Orders    Microalbumin/Creatinine Ratio, Urine    Hemoglobin A1C    Vitamin B deficiency    Relevant Orders    Vitamin B12 & Folate    BMI 32.0-32.9,adult       GI    Encounter for screening for colorectal malignant neoplasm    Relevant Orders    Ambulatory referral/consult to Gastroenterology       Orthopedic    Acute  pain of right knee    Relevant Orders    Ambulatory referral/consult to Orthopedics       Other    Fatigue    Relevant Orders    Thyroid Panel       Health Maintenance Topics with due status: Not Due       Topic Last Completion Date    Lipid Panel 10/06/2021    Cervical Cancer Screening 11/19/2021    Mammogram 04/01/2022       Future Appointments   Date Time Provider Department Center   10/19/2022  9:15 AM JUAN ALBERTO KRAUSE [facility] [place]            Signature:  Sravani Montague NP      [address] [place] [address]    Date of encounter: 10/11/22

## 2022-10-11 ENCOUNTER — OFFICE VISIT (OUTPATIENT)
Dept: FAMILY MEDICINE | Facility: CLINIC | Age: 52
End: 2022-10-11
Payer: COMMERCIAL

## 2022-10-11 VITALS
HEIGHT: 65 IN | HEART RATE: 94 BPM | SYSTOLIC BLOOD PRESSURE: 162 MMHG | WEIGHT: 194.81 LBS | BODY MASS INDEX: 32.46 KG/M2 | RESPIRATION RATE: 18 BRPM | DIASTOLIC BLOOD PRESSURE: 110 MMHG | TEMPERATURE: 98 F

## 2022-10-11 DIAGNOSIS — I10 HYPERTENSION, UNSPECIFIED TYPE: Primary | ICD-10-CM

## 2022-10-11 DIAGNOSIS — E55.9 VITAMIN D DEFICIENCY: ICD-10-CM

## 2022-10-11 DIAGNOSIS — Z12.11 ENCOUNTER FOR SCREENING FOR COLORECTAL MALIGNANT NEOPLASM: ICD-10-CM

## 2022-10-11 DIAGNOSIS — Z12.12 ENCOUNTER FOR SCREENING FOR COLORECTAL MALIGNANT NEOPLASM: ICD-10-CM

## 2022-10-11 DIAGNOSIS — E78.5 HYPERLIPIDEMIA, UNSPECIFIED HYPERLIPIDEMIA TYPE: ICD-10-CM

## 2022-10-11 DIAGNOSIS — R73.01 IMPAIRED FASTING BLOOD SUGAR: ICD-10-CM

## 2022-10-11 DIAGNOSIS — M25.561 ACUTE PAIN OF RIGHT KNEE: ICD-10-CM

## 2022-10-11 DIAGNOSIS — R53.83 FATIGUE, UNSPECIFIED TYPE: ICD-10-CM

## 2022-10-11 DIAGNOSIS — J30.89 ALLERGIC RHINITIS DUE TO OTHER ALLERGIC TRIGGER, UNSPECIFIED SEASONALITY: ICD-10-CM

## 2022-10-11 DIAGNOSIS — E53.9 VITAMIN B DEFICIENCY: ICD-10-CM

## 2022-10-11 DIAGNOSIS — Z11.59 SCREENING FOR VIRAL DISEASE: ICD-10-CM

## 2022-10-11 LAB
25(OH)D3 SERPL-MCNC: 27.4 NG/ML
ALBUMIN SERPL BCP-MCNC: 3.6 G/DL (ref 3.5–5)
ALBUMIN/GLOB SERPL: 0.9 {RATIO}
ALP SERPL-CCNC: 72 U/L (ref 41–108)
ALT SERPL W P-5'-P-CCNC: 33 U/L (ref 13–56)
ANION GAP SERPL CALCULATED.3IONS-SCNC: 12 MMOL/L (ref 7–16)
AST SERPL W P-5'-P-CCNC: 16 U/L (ref 15–37)
BASOPHILS # BLD AUTO: 0.07 K/UL (ref 0–0.2)
BASOPHILS NFR BLD AUTO: 0.9 % (ref 0–1)
BILIRUB SERPL-MCNC: 0.4 MG/DL (ref ?–1.2)
BUN SERPL-MCNC: 13 MG/DL (ref 7–18)
BUN/CREAT SERPL: 16 (ref 6–20)
CALCIUM SERPL-MCNC: 9.4 MG/DL (ref 8.5–10.1)
CHLORIDE SERPL-SCNC: 104 MMOL/L (ref 98–107)
CHOLEST SERPL-MCNC: 212 MG/DL (ref 0–200)
CHOLEST/HDLC SERPL: 4.6 {RATIO}
CO2 SERPL-SCNC: 28 MMOL/L (ref 21–32)
CREAT SERPL-MCNC: 0.83 MG/DL (ref 0.55–1.02)
DIFFERENTIAL METHOD BLD: ABNORMAL
EGFR (NO RACE VARIABLE) (RUSH/TITUS): 85 ML/MIN/1.73M²
EOSINOPHIL # BLD AUTO: 0.41 K/UL (ref 0–0.5)
EOSINOPHIL NFR BLD AUTO: 5.5 % (ref 1–4)
ERYTHROCYTE [DISTWIDTH] IN BLOOD BY AUTOMATED COUNT: 13.4 % (ref 11.5–14.5)
EST. AVERAGE GLUCOSE BLD GHB EST-MCNC: 114 MG/DL
FOLATE SERPL-MCNC: 9.8 NG/ML (ref 3.1–17.5)
GLOBULIN SER-MCNC: 3.9 G/DL (ref 2–4)
GLUCOSE SERPL-MCNC: 105 MG/DL (ref 74–106)
HBA1C MFR BLD HPLC: 6 % (ref 4.5–6.6)
HCT VFR BLD AUTO: 41 % (ref 38–47)
HCV AB SER QL: NORMAL
HDLC SERPL-MCNC: 46 MG/DL (ref 40–60)
HGB BLD-MCNC: 12.9 G/DL (ref 12–16)
HIV 1+O+2 AB SERPL QL: NORMAL
IMM GRANULOCYTES # BLD AUTO: 0.01 K/UL (ref 0–0.04)
IMM GRANULOCYTES NFR BLD: 0.1 % (ref 0–0.4)
LDLC SERPL CALC-MCNC: 148 MG/DL
LDLC/HDLC SERPL: 3.2 {RATIO}
LYMPHOCYTES # BLD AUTO: 3.49 K/UL (ref 1–4.8)
LYMPHOCYTES NFR BLD AUTO: 46.5 % (ref 27–41)
MCH RBC QN AUTO: 27.7 PG (ref 27–31)
MCHC RBC AUTO-ENTMCNC: 31.5 G/DL (ref 32–36)
MCV RBC AUTO: 88 FL (ref 80–96)
MONOCYTES # BLD AUTO: 0.66 K/UL (ref 0–0.8)
MONOCYTES NFR BLD AUTO: 8.8 % (ref 2–6)
MPC BLD CALC-MCNC: 10.6 FL (ref 9.4–12.4)
NEUTROPHILS # BLD AUTO: 2.86 K/UL (ref 1.8–7.7)
NEUTROPHILS NFR BLD AUTO: 38.2 % (ref 53–65)
NONHDLC SERPL-MCNC: 166 MG/DL
NRBC # BLD AUTO: 0 X10E3/UL
NRBC, AUTO (.00): 0 %
PLATELET # BLD AUTO: 313 K/UL (ref 150–400)
POTASSIUM SERPL-SCNC: 4 MMOL/L (ref 3.5–5.1)
PROT SERPL-MCNC: 7.5 G/DL (ref 6.4–8.2)
RBC # BLD AUTO: 4.66 M/UL (ref 4.2–5.4)
SODIUM SERPL-SCNC: 140 MMOL/L (ref 136–145)
T4 FREE SERPL-MCNC: 0.92 NG/DL (ref 0.76–1.46)
TRIGL SERPL-MCNC: 89 MG/DL (ref 35–150)
TSH SERPL DL<=0.005 MIU/L-ACNC: 2.36 UIU/ML (ref 0.36–3.74)
VIT B12 SERPL-MCNC: 561 PG/ML (ref 193–986)
VLDLC SERPL-MCNC: 18 MG/DL
WBC # BLD AUTO: 7.5 K/UL (ref 4.5–11)

## 2022-10-11 PROCEDURE — 1160F PR REVIEW ALL MEDS BY PRESCRIBER/CLIN PHARMACIST DOCUMENTED: ICD-10-PCS | Mod: CPTII,,, | Performed by: NURSE PRACTITIONER

## 2022-10-11 PROCEDURE — 80061 LIPID PANEL: CPT | Mod: ,,, | Performed by: CLINICAL MEDICAL LABORATORY

## 2022-10-11 PROCEDURE — 80061 LIPID PANEL: ICD-10-PCS | Mod: ,,, | Performed by: CLINICAL MEDICAL LABORATORY

## 2022-10-11 PROCEDURE — 82746 ASSAY OF FOLIC ACID SERUM: CPT | Mod: ,,, | Performed by: CLINICAL MEDICAL LABORATORY

## 2022-10-11 PROCEDURE — 84439 THYROID PANEL: ICD-10-PCS | Mod: ,,, | Performed by: CLINICAL MEDICAL LABORATORY

## 2022-10-11 PROCEDURE — 83036 HEMOGLOBIN A1C: ICD-10-PCS | Mod: ,,, | Performed by: CLINICAL MEDICAL LABORATORY

## 2022-10-11 PROCEDURE — 1159F PR MEDICATION LIST DOCUMENTED IN MEDICAL RECORD: ICD-10-PCS | Mod: CPTII,,, | Performed by: NURSE PRACTITIONER

## 2022-10-11 PROCEDURE — 3008F BODY MASS INDEX DOCD: CPT | Mod: CPTII,,, | Performed by: NURSE PRACTITIONER

## 2022-10-11 PROCEDURE — 84439 ASSAY OF FREE THYROXINE: CPT | Mod: ,,, | Performed by: CLINICAL MEDICAL LABORATORY

## 2022-10-11 PROCEDURE — 82607 VITAMIN B12/FOLATE, SERUM PANEL: ICD-10-PCS | Mod: ,,, | Performed by: CLINICAL MEDICAL LABORATORY

## 2022-10-11 PROCEDURE — 82570 ASSAY OF URINE CREATININE: CPT | Mod: ,,, | Performed by: CLINICAL MEDICAL LABORATORY

## 2022-10-11 PROCEDURE — 82607 VITAMIN B-12: CPT | Mod: ,,, | Performed by: CLINICAL MEDICAL LABORATORY

## 2022-10-11 PROCEDURE — 1159F MED LIST DOCD IN RCRD: CPT | Mod: CPTII,,, | Performed by: NURSE PRACTITIONER

## 2022-10-11 PROCEDURE — 82306 VITAMIN D 25 HYDROXY: CPT | Mod: ,,, | Performed by: CLINICAL MEDICAL LABORATORY

## 2022-10-11 PROCEDURE — 87389 HIV-1 AG W/HIV-1&-2 AB AG IA: CPT | Mod: ,,, | Performed by: CLINICAL MEDICAL LABORATORY

## 2022-10-11 PROCEDURE — 82043 UR ALBUMIN QUANTITATIVE: CPT | Mod: ,,, | Performed by: CLINICAL MEDICAL LABORATORY

## 2022-10-11 PROCEDURE — 3077F PR MOST RECENT SYSTOLIC BLOOD PRESSURE >= 140 MM HG: ICD-10-PCS | Mod: CPTII,,, | Performed by: NURSE PRACTITIONER

## 2022-10-11 PROCEDURE — 99213 PR OFFICE/OUTPT VISIT, EST, LEVL III, 20-29 MIN: ICD-10-PCS | Mod: ,,, | Performed by: NURSE PRACTITIONER

## 2022-10-11 PROCEDURE — 1160F RVW MEDS BY RX/DR IN RCRD: CPT | Mod: CPTII,,, | Performed by: NURSE PRACTITIONER

## 2022-10-11 PROCEDURE — 80050 PR  GENERAL HEALTH PANEL: ICD-10-PCS | Mod: ,,, | Performed by: CLINICAL MEDICAL LABORATORY

## 2022-10-11 PROCEDURE — 86803 HEPATITIS C ANTIBODY: ICD-10-PCS | Mod: ,,, | Performed by: CLINICAL MEDICAL LABORATORY

## 2022-10-11 PROCEDURE — 3008F PR BODY MASS INDEX (BMI) DOCUMENTED: ICD-10-PCS | Mod: CPTII,,, | Performed by: NURSE PRACTITIONER

## 2022-10-11 PROCEDURE — 3080F DIAST BP >= 90 MM HG: CPT | Mod: CPTII,,, | Performed by: NURSE PRACTITIONER

## 2022-10-11 PROCEDURE — 82043 MICROALBUMIN / CREATININE RATIO URINE: ICD-10-PCS | Mod: ,,, | Performed by: CLINICAL MEDICAL LABORATORY

## 2022-10-11 PROCEDURE — 83036 HEMOGLOBIN GLYCOSYLATED A1C: CPT | Mod: ,,, | Performed by: CLINICAL MEDICAL LABORATORY

## 2022-10-11 PROCEDURE — 87389 HIV 1 / 2 ANTIBODY: ICD-10-PCS | Mod: ,,, | Performed by: CLINICAL MEDICAL LABORATORY

## 2022-10-11 PROCEDURE — 82746 VITAMIN B12/FOLATE, SERUM PANEL: ICD-10-PCS | Mod: ,,, | Performed by: CLINICAL MEDICAL LABORATORY

## 2022-10-11 PROCEDURE — 86803 HEPATITIS C AB TEST: CPT | Mod: ,,, | Performed by: CLINICAL MEDICAL LABORATORY

## 2022-10-11 PROCEDURE — 3080F PR MOST RECENT DIASTOLIC BLOOD PRESSURE >= 90 MM HG: ICD-10-PCS | Mod: CPTII,,, | Performed by: NURSE PRACTITIONER

## 2022-10-11 PROCEDURE — 99213 OFFICE O/P EST LOW 20 MIN: CPT | Mod: ,,, | Performed by: NURSE PRACTITIONER

## 2022-10-11 PROCEDURE — 82570 MICROALBUMIN / CREATININE RATIO URINE: ICD-10-PCS | Mod: ,,, | Performed by: CLINICAL MEDICAL LABORATORY

## 2022-10-11 PROCEDURE — 3077F SYST BP >= 140 MM HG: CPT | Mod: CPTII,,, | Performed by: NURSE PRACTITIONER

## 2022-10-11 PROCEDURE — 82306 VITAMIN D: ICD-10-PCS | Mod: ,,, | Performed by: CLINICAL MEDICAL LABORATORY

## 2022-10-11 PROCEDURE — 80050 GENERAL HEALTH PANEL: CPT | Mod: ,,, | Performed by: CLINICAL MEDICAL LABORATORY

## 2022-10-11 RX ORDER — FLUTICASONE PROPIONATE 50 MCG
2 SPRAY, SUSPENSION (ML) NASAL 2 TIMES DAILY
Qty: 16 G | Refills: 1 | Status: SHIPPED | OUTPATIENT
Start: 2022-10-11 | End: 2023-03-08 | Stop reason: SDUPTHER

## 2022-10-11 RX ORDER — ERGOCALCIFEROL 1.25 MG/1
CAPSULE ORAL
Qty: 12 CAPSULE | Refills: 1 | Status: SHIPPED | OUTPATIENT
Start: 2022-10-11 | End: 2023-03-09

## 2022-10-11 RX ORDER — CETIRIZINE HYDROCHLORIDE 10 MG/1
10 TABLET ORAL DAILY
Qty: 90 TABLET | Refills: 1 | Status: SHIPPED | OUTPATIENT
Start: 2022-10-11 | End: 2023-03-08 | Stop reason: SDUPTHER

## 2022-10-11 RX ORDER — FLUTICASONE PROPIONATE 50 MCG
2 SPRAY, SUSPENSION (ML) NASAL 2 TIMES DAILY
COMMUNITY
Start: 2022-04-20 | End: 2022-10-11 | Stop reason: SDUPTHER

## 2022-10-11 RX ORDER — HYDROCHLOROTHIAZIDE 12.5 MG/1
12.5 TABLET ORAL DAILY
Qty: 90 TABLET | Refills: 1 | Status: SHIPPED | OUTPATIENT
Start: 2022-10-11 | End: 2023-03-08 | Stop reason: SDUPTHER

## 2022-10-11 RX ORDER — FEXOFENADINE HCL 60 MG
60 TABLET ORAL DAILY
Qty: 90 TABLET | Refills: 1 | Status: SHIPPED | OUTPATIENT
Start: 2022-10-11 | End: 2023-03-08 | Stop reason: SDUPTHER

## 2022-10-11 RX ORDER — ASPIRIN 81 MG/1
81 TABLET ORAL DAILY
Qty: 90 TABLET | Refills: 1 | Status: SHIPPED | OUTPATIENT
Start: 2022-10-11 | End: 2023-03-09 | Stop reason: SDUPTHER

## 2022-10-11 RX ORDER — AMLODIPINE BESYLATE 10 MG/1
10 TABLET ORAL DAILY
Qty: 90 TABLET | Refills: 1 | Status: SHIPPED | OUTPATIENT
Start: 2022-10-11 | End: 2023-03-08 | Stop reason: SDUPTHER

## 2022-10-11 RX ORDER — PRAVASTATIN SODIUM 10 MG/1
10 TABLET ORAL DAILY
Qty: 90 TABLET | Refills: 1 | Status: SHIPPED | OUTPATIENT
Start: 2022-10-11 | End: 2023-03-08 | Stop reason: SDUPTHER

## 2022-10-12 DIAGNOSIS — Z12.11 SCREEN FOR COLON CANCER: Primary | ICD-10-CM

## 2022-10-12 LAB
CREAT UR-MCNC: 185 MG/DL (ref 28–219)
MICROALBUMIN UR-MCNC: 0.8 MG/DL (ref 0–2.8)
MICROALBUMIN/CREAT RATIO PNL UR: 4.3 MG/G (ref 0–30)

## 2022-10-12 RX ORDER — POLYETHYLENE GLYCOL 3350, SODIUM SULFATE ANHYDROUS, SODIUM BICARBONATE, SODIUM CHLORIDE, POTASSIUM CHLORIDE 236; 22.74; 6.74; 5.86; 2.97 G/4L; G/4L; G/4L; G/4L; G/4L
4 POWDER, FOR SOLUTION ORAL ONCE
Qty: 4000 ML | Refills: 0 | Status: SHIPPED | OUTPATIENT
Start: 2022-10-12 | End: 2022-10-12

## 2022-10-19 ENCOUNTER — TELEPHONE (OUTPATIENT)
Dept: FAMILY MEDICINE | Facility: CLINIC | Age: 52
End: 2022-10-19
Payer: COMMERCIAL

## 2022-10-19 ENCOUNTER — CLINICAL SUPPORT (OUTPATIENT)
Dept: FAMILY MEDICINE | Facility: CLINIC | Age: 52
End: 2022-10-19
Payer: COMMERCIAL

## 2022-10-19 DIAGNOSIS — I10 HYPERTENSION, UNSPECIFIED TYPE: Primary | ICD-10-CM

## 2022-10-19 NOTE — TELEPHONE ENCOUNTER
Walk in for BP check     /93 P-98  Rechecked: /92 P 91    Patient instructed Mrs Montague is not here today will let her know bp and to f/u with Juan C tomorrow on instruction on meds     Patient also has mychart and she said to leave her a vm or send her a [employer] message to f/u

## 2022-10-19 NOTE — PROGRESS NOTES
Walk in for BP check     /93 P-98  Rechecked: /92 P 91    Patient instructed Mrs Montague is not here today will let her know bp and to f/u with Juan C tomorrow on instruction on meds

## 2022-10-27 NOTE — TELEPHONE ENCOUNTER
Spoke with patient. Patient states she is taking medication daily. Patient will check bp tomorrow at work and contact the office if elevated.

## 2022-10-31 ENCOUNTER — HOSPITAL ENCOUNTER (OUTPATIENT)
Dept: RADIOLOGY | Facility: HOSPITAL | Age: 52
Discharge: HOME | End: 2022-10-31
Attending: ORTHOPAEDIC SURGERY
Payer: COMMERCIAL

## 2022-10-31 DIAGNOSIS — M25.561 ACUTE PAIN OF RIGHT KNEE: ICD-10-CM

## 2022-10-31 PROCEDURE — 73562 X-RAY EXAM OF KNEE 3: CPT | Mod: TC,RT

## 2022-11-09 ENCOUNTER — HOSPITAL ENCOUNTER (OUTPATIENT)
Dept: GASTROENTEROLOGY | Facility: HOSPITAL | Age: 52
Discharge: HOME | End: 2022-11-09
Attending: INTERNAL MEDICINE
Payer: COMMERCIAL

## 2022-11-09 ENCOUNTER — ANESTHESIA EVENT (OUTPATIENT)
Dept: GASTROENTEROLOGY | Facility: HOSPITAL | Age: 52
End: 2022-11-09
Payer: COMMERCIAL

## 2022-11-09 ENCOUNTER — ANESTHESIA (OUTPATIENT)
Dept: GASTROENTEROLOGY | Facility: HOSPITAL | Age: 52
End: 2022-11-09
Payer: COMMERCIAL

## 2022-11-09 VITALS
OXYGEN SATURATION: 97 % | BODY MASS INDEX: 32.62 KG/M2 | DIASTOLIC BLOOD PRESSURE: 75 MMHG | SYSTOLIC BLOOD PRESSURE: 108 MMHG | RESPIRATION RATE: 16 BRPM | HEART RATE: 62 BPM | WEIGHT: 196 LBS

## 2022-11-09 DIAGNOSIS — Z12.11 SCREEN FOR COLON CANCER: ICD-10-CM

## 2022-11-09 DIAGNOSIS — Z12.12 ENCOUNTER FOR SCREENING FOR COLORECTAL MALIGNANT NEOPLASM: Primary | ICD-10-CM

## 2022-11-09 DIAGNOSIS — Z12.11 ENCOUNTER FOR SCREENING FOR COLORECTAL MALIGNANT NEOPLASM: Primary | ICD-10-CM

## 2022-11-09 PROBLEM — Z86.0100 H/O COLONOSCOPY WITH POLYPECTOMY: Status: ACTIVE | Noted: 2022-11-09

## 2022-11-09 PROBLEM — Z86.0100 HISTORY OF COLONIC POLYPS: Status: ACTIVE | Noted: 2022-11-09

## 2022-11-09 PROBLEM — Z86.010 H/O COLONOSCOPY WITH POLYPECTOMY: Status: ACTIVE | Noted: 2022-11-09

## 2022-11-09 PROBLEM — Z86.010 HISTORY OF COLONIC POLYPS: Status: ACTIVE | Noted: 2022-11-09

## 2022-11-09 PROBLEM — Z98.890 H/O COLONOSCOPY WITH POLYPECTOMY: Status: ACTIVE | Noted: 2022-11-09

## 2022-11-09 PROCEDURE — 45380 COLONOSCOPY AND BIOPSY: CPT

## 2022-11-09 PROCEDURE — 88305 TISSUE EXAM BY PATHOLOGIST: CPT | Mod: SUR | Performed by: INTERNAL MEDICINE

## 2022-11-09 PROCEDURE — D9220A PRA ANESTHESIA: ICD-10-PCS | Mod: 33,,, | Performed by: NURSE ANESTHETIST, CERTIFIED REGISTERED

## 2022-11-09 PROCEDURE — 25000003 PHARM REV CODE 250: Performed by: NURSE ANESTHETIST, CERTIFIED REGISTERED

## 2022-11-09 PROCEDURE — 37000009 HC ANESTHESIA EA ADD 15 MINS

## 2022-11-09 PROCEDURE — 88305 SURGICAL PATHOLOGY: ICD-10-PCS | Mod: 26,,, | Performed by: PATHOLOGY

## 2022-11-09 PROCEDURE — 45380 PR COLONOSCOPY,BIOPSY: ICD-10-PCS | Mod: 33,,, | Performed by: INTERNAL MEDICINE

## 2022-11-09 PROCEDURE — 63600175 PHARM REV CODE 636 W HCPCS: Performed by: NURSE ANESTHETIST, CERTIFIED REGISTERED

## 2022-11-09 PROCEDURE — D9220A PRA ANESTHESIA: Mod: 33,,, | Performed by: NURSE ANESTHETIST, CERTIFIED REGISTERED

## 2022-11-09 PROCEDURE — 45380 COLONOSCOPY AND BIOPSY: CPT | Mod: 33,,, | Performed by: INTERNAL MEDICINE

## 2022-11-09 PROCEDURE — 37000008 HC ANESTHESIA 1ST 15 MINUTES

## 2022-11-09 PROCEDURE — 88305 TISSUE EXAM BY PATHOLOGIST: CPT | Mod: 26,,, | Performed by: PATHOLOGY

## 2022-11-09 RX ORDER — PHENYLEPHRINE HYDROCHLORIDE 10 MG/ML
INJECTION INTRAVENOUS
Status: DISCONTINUED | OUTPATIENT
Start: 2022-11-09 | End: 2022-11-09

## 2022-11-09 RX ORDER — LIDOCAINE HYDROCHLORIDE 20 MG/ML
INJECTION, SOLUTION EPIDURAL; INFILTRATION; INTRACAUDAL; PERINEURAL
Status: DISCONTINUED | OUTPATIENT
Start: 2022-11-09 | End: 2022-11-09

## 2022-11-09 RX ORDER — PROPOFOL 10 MG/ML
INJECTION, EMULSION INTRAVENOUS
Status: DISCONTINUED | OUTPATIENT
Start: 2022-11-09 | End: 2022-11-09

## 2022-11-09 RX ORDER — ONDANSETRON 2 MG/ML
INJECTION INTRAMUSCULAR; INTRAVENOUS
Status: DISCONTINUED | OUTPATIENT
Start: 2022-11-09 | End: 2022-11-09

## 2022-11-09 RX ADMIN — PROPOFOL 40 MG: 10 INJECTION, EMULSION INTRAVENOUS at 09:11

## 2022-11-09 RX ADMIN — PROPOFOL 30 MG: 10 INJECTION, EMULSION INTRAVENOUS at 09:11

## 2022-11-09 RX ADMIN — PROPOFOL 80 MG: 10 INJECTION, EMULSION INTRAVENOUS at 09:11

## 2022-11-09 RX ADMIN — ONDANSETRON 4 MG: 2 INJECTION INTRAMUSCULAR; INTRAVENOUS at 09:11

## 2022-11-09 RX ADMIN — PHENYLEPHRINE HYDROCHLORIDE 100 MCG: 10 INJECTION INTRAVENOUS at 09:11

## 2022-11-09 RX ADMIN — LIDOCAINE HYDROCHLORIDE 50 MG: 20 INJECTION, SOLUTION INTRAVENOUS at 09:11

## 2022-11-09 RX ADMIN — PHENYLEPHRINE HYDROCHLORIDE 150 MCG: 10 INJECTION INTRAVENOUS at 09:11

## 2022-11-09 RX ADMIN — SODIUM CHLORIDE: 9 INJECTION, SOLUTION INTRAVENOUS at 09:11

## 2022-11-09 NOTE — ANESTHESIA POSTPROCEDURE EVALUATION
Anesthesia Post Evaluation    Patient: Sandra Pérez    Procedure(s) Performed: * No procedures listed *    Final Anesthesia Type: general      Patient location during evaluation: PACU  Patient participation: Yes- Able to Participate  Level of consciousness: awake and alert and oriented  Post-procedure vital signs: reviewed and stable  Pain management: adequate  Airway patency: patent    PONV status at discharge: No PONV  Anesthetic complications: no      Cardiovascular status: blood pressure returned to baseline and hemodynamically stable  Respiratory status: unassisted  Hydration status: euvolemic  Follow-up not needed.          Vitals Value Taken Time   /76 11/09/22 1013   Temp  11/09/22 1014   Pulse 66 11/09/22 1013   Resp 13 11/09/22 1013   SpO2 98 % 11/09/22 1013   Vitals shown include unvalidated device data.      No case tracking events are documented in the log.      Pain/Franco Score: Franco Score: 9 (11/9/2022  9:52 AM)

## 2022-11-09 NOTE — H&P
Gastroenterology Pre-procedure H&P    Chief Complaint: Encounter for screening for colorectal malignant neoplasm    History of Present Illness    Sandra Pérez is a 52 y.o. female that  has a past medical history of Hyperlipemia, Hypertension, PONV (postoperative nausea and vomiting), and Pulmonary edema. Patient here for routine index screening.    Patient denies wt loss, abdominal pain, diarrhea, melena/hematochezia, change in stool caliber, no anticoagulants, FMHx of GI related malignancies.      Past Medical History:   Diagnosis Date    Hyperlipemia     Hypertension     PONV (postoperative nausea and vomiting)     Pulmonary edema        Past Surgical History:   Procedure Laterality Date    BILATERAL TUBAL LIGATION      BUNIONECTOMY      CHOLECYSTECTOMY         Family History   Problem Relation Age of Onset    Hypertension Mother     Diabetes Mother     No Known Problems Father     Hypertension Sister        Social History     Socioeconomic History    Marital status: Single   Tobacco Use    Smoking status: Never    Smokeless tobacco: Never   Substance and Sexual Activity    Alcohol use: Yes     Comment: occasionally    Drug use: Never    Sexual activity: Yes     Partners: Male       Current Outpatient Medications   Medication Sig Dispense Refill    amLODIPine (NORVASC) 10 MG tablet Take 1 tablet (10 mg total) by mouth once daily. 90 tablet 1    aspirin (ECOTRIN) 81 MG EC tablet Take 1 tablet (81 mg total) by mouth once daily. 90 tablet 1    cetirizine (ZYRTEC) 10 MG tablet Take 1 tablet (10 mg total) by mouth once daily. 90 tablet 1    cyclobenzaprine (FLEXERIL) 10 MG tablet Take 1 tablet (10 mg total) by mouth 3 (three) times daily as needed. 45 tablet 1    ergocalciferol (ERGOCALCIFEROL) 50,000 unit Cap Take one capsule weekly for 12 weeks then reduce to one capsule monthly 12 capsule 1    fexofenadine (ALLEGRA ALLERGY) 60 MG tablet Take 1 tablet (60 mg total) by mouth once daily. 90 tablet 1    fluticasone  propionate (FLONASE) 50 mcg/actuation nasal spray 2 sprays (100 mcg total) by Each Nostril route 2 (two) times daily. 16 g 1    hydroCHLOROthiazide (HYDRODIURIL) 12.5 MG Tab Take 1 tablet (12.5 mg total) by mouth once daily. 90 tablet 1    pravastatin (PRAVACHOL) 10 MG tablet Take 1 tablet (10 mg total) by mouth once daily. 90 tablet 1    sumatriptan (IMITREX) 100 MG tablet Take one tablet at the onset of headache, may repeat dose in 2 hours, no more than 2 in a day or 2 days in a week (Patient not taking: Reported on 10/11/2022) 12 tablet 3    topiramate (TOPAMAX) 100 MG tablet Take one tablet at bedtime (Patient not taking: Reported on 10/11/2022) 30 tablet 3     No current facility-administered medications for this encounter.       Review of patient's allergies indicates:   Allergen Reactions    Shellfish containing products Edema    Lortab [hydrocodone-acetaminophen] Nausea And Vomiting       Objective:  Vitals:    11/09/22 0915   BP: 112/76   Pulse: 77   Resp: 16   SpO2: 98%   Weight: 88.9 kg (196 lb)        GEN: normal appearing, NAD, AAO x3  HENT: NCAT, anicteric, OP benign  CV: normal rate, regular rhythm  RESP: NABS, symmetric rise, unlabored  ABD: soft, ND, no guarding or TTP  SKIN: warm and dry  NEURO: grossly afocal    Assessment and Plan:    Proceed with:    Colonoscopy for screening for colon cancer    Tomer Llanos MD  Gastroenterology

## 2022-11-09 NOTE — TRANSFER OF CARE
Anesthesia Transfer of Care Note    Patient: Sandra PARKER Pérez    Procedure(s) Performed: * No procedures listed *    Patient location: PACU    Anesthesia Type: general    Transport from OR: Transported from OR on room air with adequate spontaneous ventilation    Post pain: adequate analgesia    Post assessment: no apparent anesthetic complications    Post vital signs: stable    Level of consciousness: alert and responds to stimulation    Nausea/Vomiting: no nausea/vomiting    Complications: none    Transfer of care protocol was followed      Last vitals:   Visit Vitals  BP (!) 88/51 (Patient Position: Lying)   Pulse 73   Resp 16   Wt 88.9 kg (196 lb)   SpO2 100%   Breastfeeding No   BMI 32.62 kg/m²

## 2022-11-09 NOTE — ANESTHESIA PREPROCEDURE EVALUATION
11/09/2022  Sandra Pérez is a 52 y.o., female.      Pre-op Assessment    I have reviewed the Patient Summary Reports.     I have reviewed the Nursing Notes. I have reviewed the NPO Status.   I have reviewed the Medications.     Review of Systems  Anesthesia Hx:  PONV   Cardiovascular:   Hypertension    Pulmonary:   Sleep Apnea    Neurological:   Headaches    Endocrine:  Obesity / BMI > 30      Physical Exam  General: Well nourished, Alert and Oriented    Airway:  Mallampati: II   Mouth Opening: Normal  TM Distance: Normal  Tongue: Normal  Neck ROM: Normal ROM    Dental:  Intact        Anesthesia Plan  Type of Anesthesia, risks & benefits discussed:    Anesthesia Type: Gen Natural Airway  Intra-op Monitoring Plan: Standard ASA Monitors  Post Op Pain Control Plan: multimodal analgesia  Induction:  IV  Informed Consent: Informed consent signed with the Patient and all parties understand the risks and agree with anesthesia plan.  All questions answered. Patient consented to blood products? Yes  ASA Score: 2  Day of Surgery Review of History & Physical: H&P Update referred to the surgeon/provider.    Ready For Surgery From Anesthesia Perspective.     .

## 2022-11-09 NOTE — DISCHARGE INSTRUCTIONS
Procedure Date  11/9/22     Impression  Overall Impression:   - 2 small polyps in the ascending colon removed with forceps polypectomy   - Small internal hemorrhoids  - The exam was otherwise normal     Recommendation    Repeat colonoscopy in 7 years         Outcome of procedure: successful Colonoscopy  Disposition: patient to recovery following procedure; discharge to home when appropriate parameters met  Provisions for follow up: please call my office for any unexpected symptoms like chest or abdominal pain or bleeding following your procedure.  Final Diagnosis: colon cancer screening, colon polyps     No driving today, no operating heavy machinery, no signing any legal documents until tomorrow.    Drink lots of fluids, resume regular diet.  Take your normal medications.

## 2022-11-10 LAB
ESTROGEN SERPL-MCNC: NORMAL PG/ML
INSULIN SERPL-ACNC: NORMAL U[IU]/ML
LAB AP GROSS DESCRIPTION: NORMAL
LAB AP LABORATORY NOTES: NORMAL
T3RU NFR SERPL: NORMAL %

## 2022-11-16 PROBLEM — S83.241A ACUTE MEDIAL MENISCUS TEAR OF RIGHT KNEE: Status: ACTIVE | Noted: 2022-11-16

## 2022-11-30 PROBLEM — S83.241A ACUTE MEDIAL MENISCUS TEAR OF RIGHT KNEE: Chronic | Status: ACTIVE | Noted: 2022-11-16

## 2022-12-01 ENCOUNTER — ANESTHESIA EVENT (OUTPATIENT)
Dept: SURGERY | Facility: HOSPITAL | Age: 52
End: 2022-12-01
Payer: COMMERCIAL

## 2022-12-01 ENCOUNTER — ANESTHESIA (OUTPATIENT)
Dept: SURGERY | Facility: HOSPITAL | Age: 52
End: 2022-12-01
Payer: COMMERCIAL

## 2022-12-01 ENCOUNTER — HOSPITAL ENCOUNTER (OUTPATIENT)
Facility: HOSPITAL | Age: 52
Discharge: HOME | End: 2022-12-01
Attending: ORTHOPAEDIC SURGERY | Admitting: ORTHOPAEDIC SURGERY
Payer: COMMERCIAL

## 2022-12-01 VITALS
DIASTOLIC BLOOD PRESSURE: 69 MMHG | OXYGEN SATURATION: 96 % | TEMPERATURE: 98 F | RESPIRATION RATE: 16 BRPM | SYSTOLIC BLOOD PRESSURE: 109 MMHG | HEIGHT: 65 IN | HEART RATE: 88 BPM | BODY MASS INDEX: 32.65 KG/M2 | WEIGHT: 196 LBS

## 2022-12-01 DIAGNOSIS — S83.241A ACUTE MEDIAL MENISCUS TEAR OF RIGHT KNEE: Primary | ICD-10-CM

## 2022-12-01 PROCEDURE — 27000284 HC CANNULA NASAL: Performed by: ANESTHESIOLOGY

## 2022-12-01 PROCEDURE — 27201423 OPTIME MED/SURG SUP & DEVICES STERILE SUPPLY: Performed by: ORTHOPAEDIC SURGERY

## 2022-12-01 PROCEDURE — 25000003 PHARM REV CODE 250: Performed by: NURSE ANESTHETIST, CERTIFIED REGISTERED

## 2022-12-01 PROCEDURE — 36000710: Performed by: ORTHOPAEDIC SURGERY

## 2022-12-01 PROCEDURE — 25000003 PHARM REV CODE 250: Performed by: ORTHOPAEDIC SURGERY

## 2022-12-01 PROCEDURE — 63600175 PHARM REV CODE 636 W HCPCS: Performed by: NURSE ANESTHETIST, CERTIFIED REGISTERED

## 2022-12-01 PROCEDURE — D9220A PRA ANESTHESIA: Mod: CRNA,,, | Performed by: NURSE ANESTHETIST, CERTIFIED REGISTERED

## 2022-12-01 PROCEDURE — 71000033 HC RECOVERY, INTIAL HOUR: Performed by: ORTHOPAEDIC SURGERY

## 2022-12-01 PROCEDURE — 37000009 HC ANESTHESIA EA ADD 15 MINS: Performed by: ORTHOPAEDIC SURGERY

## 2022-12-01 PROCEDURE — 27000177 HC AIRWAY, LARYNGEAL MASK: Performed by: ANESTHESIOLOGY

## 2022-12-01 PROCEDURE — 71000015 HC POSTOP RECOV 1ST HR: Performed by: ORTHOPAEDIC SURGERY

## 2022-12-01 PROCEDURE — 37000008 HC ANESTHESIA 1ST 15 MINUTES: Performed by: ORTHOPAEDIC SURGERY

## 2022-12-01 PROCEDURE — 36000711: Performed by: ORTHOPAEDIC SURGERY

## 2022-12-01 PROCEDURE — 27000716 HC OXISENSOR PROBE, ANY SIZE: Performed by: ANESTHESIOLOGY

## 2022-12-01 PROCEDURE — 71000016 HC POSTOP RECOV ADDL HR: Performed by: ORTHOPAEDIC SURGERY

## 2022-12-01 PROCEDURE — 27000510 HC BLANKET BAIR HUGGER ANY SIZE: Performed by: ANESTHESIOLOGY

## 2022-12-01 PROCEDURE — D9220A PRA ANESTHESIA: ICD-10-PCS | Mod: CRNA,,, | Performed by: NURSE ANESTHETIST, CERTIFIED REGISTERED

## 2022-12-01 PROCEDURE — 25000003 PHARM REV CODE 250: Performed by: ANESTHESIOLOGY

## 2022-12-01 PROCEDURE — 97161 PT EVAL LOW COMPLEX 20 MIN: CPT

## 2022-12-01 PROCEDURE — D9220A PRA ANESTHESIA: Mod: ANES,,, | Performed by: ANESTHESIOLOGY

## 2022-12-01 PROCEDURE — D9220A PRA ANESTHESIA: ICD-10-PCS | Mod: ANES,,, | Performed by: ANESTHESIOLOGY

## 2022-12-01 RX ORDER — DIPHENHYDRAMINE HYDROCHLORIDE 50 MG/ML
25 INJECTION INTRAMUSCULAR; INTRAVENOUS EVERY 6 HOURS PRN
Status: DISCONTINUED | OUTPATIENT
Start: 2022-12-01 | End: 2022-12-01 | Stop reason: HOSPADM

## 2022-12-01 RX ORDER — PROMETHAZINE HYDROCHLORIDE 25 MG/ML
INJECTION, SOLUTION INTRAMUSCULAR; INTRAVENOUS
Status: DISCONTINUED | OUTPATIENT
Start: 2022-12-01 | End: 2022-12-01

## 2022-12-01 RX ORDER — ONDANSETRON 2 MG/ML
4 INJECTION INTRAMUSCULAR; INTRAVENOUS DAILY PRN
Status: DISCONTINUED | OUTPATIENT
Start: 2022-12-01 | End: 2022-12-01 | Stop reason: HOSPADM

## 2022-12-01 RX ORDER — MIDAZOLAM HYDROCHLORIDE 5 MG/ML
INJECTION INTRAMUSCULAR; INTRAVENOUS
Status: DISCONTINUED | OUTPATIENT
Start: 2022-12-01 | End: 2022-12-01

## 2022-12-01 RX ORDER — ONDANSETRON 4 MG/1
8 TABLET, ORALLY DISINTEGRATING ORAL EVERY 8 HOURS PRN
Status: DISCONTINUED | OUTPATIENT
Start: 2022-12-01 | End: 2022-12-01 | Stop reason: HOSPADM

## 2022-12-01 RX ORDER — HYDROMORPHONE HYDROCHLORIDE 2 MG/ML
0.5 INJECTION, SOLUTION INTRAMUSCULAR; INTRAVENOUS; SUBCUTANEOUS EVERY 5 MIN PRN
Status: DISCONTINUED | OUTPATIENT
Start: 2022-12-01 | End: 2022-12-01 | Stop reason: HOSPADM

## 2022-12-01 RX ORDER — MEPERIDINE HYDROCHLORIDE 25 MG/ML
INJECTION INTRAMUSCULAR; INTRAVENOUS; SUBCUTANEOUS
Status: DISCONTINUED | OUTPATIENT
Start: 2022-12-01 | End: 2022-12-01

## 2022-12-01 RX ORDER — DEXAMETHASONE SODIUM PHOSPHATE 4 MG/ML
INJECTION, SOLUTION INTRA-ARTICULAR; INTRALESIONAL; INTRAMUSCULAR; INTRAVENOUS; SOFT TISSUE
Status: DISCONTINUED | OUTPATIENT
Start: 2022-12-01 | End: 2022-12-01

## 2022-12-01 RX ORDER — OXYCODONE HYDROCHLORIDE 5 MG/1
10 TABLET ORAL ONCE
Status: COMPLETED | OUTPATIENT
Start: 2022-12-01 | End: 2022-12-01

## 2022-12-01 RX ORDER — MORPHINE SULFATE 10 MG/ML
4 INJECTION INTRAMUSCULAR; INTRAVENOUS; SUBCUTANEOUS EVERY 5 MIN PRN
Status: DISCONTINUED | OUTPATIENT
Start: 2022-12-01 | End: 2022-12-01 | Stop reason: HOSPADM

## 2022-12-01 RX ORDER — ONDANSETRON 2 MG/ML
INJECTION INTRAMUSCULAR; INTRAVENOUS
Status: DISCONTINUED | OUTPATIENT
Start: 2022-12-01 | End: 2022-12-01

## 2022-12-01 RX ORDER — HYDROCODONE BITARTRATE AND ACETAMINOPHEN 10; 325 MG/1; MG/1
1 TABLET ORAL EVERY 4 HOURS PRN
Status: DISCONTINUED | OUTPATIENT
Start: 2022-12-01 | End: 2022-12-01 | Stop reason: HOSPADM

## 2022-12-01 RX ORDER — FENTANYL CITRATE 50 UG/ML
INJECTION, SOLUTION INTRAMUSCULAR; INTRAVENOUS
Status: DISCONTINUED | OUTPATIENT
Start: 2022-12-01 | End: 2022-12-01

## 2022-12-01 RX ORDER — CEFAZOLIN SODIUM 1 G/3ML
INJECTION, POWDER, FOR SOLUTION INTRAMUSCULAR; INTRAVENOUS
Status: DISCONTINUED | OUTPATIENT
Start: 2022-12-01 | End: 2022-12-01

## 2022-12-01 RX ORDER — MEPERIDINE HYDROCHLORIDE 25 MG/ML
25 INJECTION INTRAMUSCULAR; INTRAVENOUS; SUBCUTANEOUS EVERY 10 MIN PRN
Status: DISCONTINUED | OUTPATIENT
Start: 2022-12-01 | End: 2022-12-01 | Stop reason: HOSPADM

## 2022-12-01 RX ORDER — DIMENHYDRINATE 50 MG
50 TABLET ORAL ONCE
Status: COMPLETED | OUTPATIENT
Start: 2022-12-01 | End: 2022-12-01

## 2022-12-01 RX ORDER — CEFAZOLIN SODIUM 2 G/50ML
2 SOLUTION INTRAVENOUS
Status: ACTIVE | OUTPATIENT
Start: 2022-12-01

## 2022-12-01 RX ORDER — LIDOCAINE HYDROCHLORIDE 20 MG/ML
INJECTION, SOLUTION EPIDURAL; INFILTRATION; INTRACAUDAL; PERINEURAL
Status: DISCONTINUED | OUTPATIENT
Start: 2022-12-01 | End: 2022-12-01

## 2022-12-01 RX ORDER — ACETAMINOPHEN 500 MG
1000 TABLET ORAL EVERY 6 HOURS PRN
Status: DISCONTINUED | OUTPATIENT
Start: 2022-12-01 | End: 2022-12-01 | Stop reason: HOSPADM

## 2022-12-01 RX ORDER — PROMETHAZINE HYDROCHLORIDE 25 MG/1
25 TABLET ORAL EVERY 6 HOURS PRN
Status: DISCONTINUED | OUTPATIENT
Start: 2022-12-01 | End: 2022-12-01 | Stop reason: HOSPADM

## 2022-12-01 RX ORDER — SODIUM CHLORIDE 9 MG/ML
INJECTION, SOLUTION INTRAVENOUS CONTINUOUS
Status: DISPENSED | OUTPATIENT
Start: 2022-12-01

## 2022-12-01 RX ORDER — HYDROCODONE BITARTRATE AND ACETAMINOPHEN 5; 325 MG/1; MG/1
1 TABLET ORAL EVERY 4 HOURS PRN
Status: DISCONTINUED | OUTPATIENT
Start: 2022-12-01 | End: 2022-12-01 | Stop reason: HOSPADM

## 2022-12-01 RX ORDER — PROPOFOL 10 MG/ML
VIAL (ML) INTRAVENOUS
Status: DISCONTINUED | OUTPATIENT
Start: 2022-12-01 | End: 2022-12-01

## 2022-12-01 RX ORDER — PHENYLEPHRINE HYDROCHLORIDE 10 MG/ML
INJECTION INTRAVENOUS
Status: DISCONTINUED | OUTPATIENT
Start: 2022-12-01 | End: 2022-12-01

## 2022-12-01 RX ADMIN — MIDAZOLAM HYDROCHLORIDE 2 MG: 5 INJECTION, SOLUTION INTRAMUSCULAR; INTRAVENOUS at 02:12

## 2022-12-01 RX ADMIN — PHENYLEPHRINE HYDROCHLORIDE 200 MCG: 10 INJECTION INTRAVENOUS at 02:12

## 2022-12-01 RX ADMIN — DIMENHYDRINATE 50 MG: 50 TABLET ORAL at 09:12

## 2022-12-01 RX ADMIN — PROPOFOL 200 MG: 10 INJECTION, EMULSION INTRAVENOUS at 02:12

## 2022-12-01 RX ADMIN — DEXAMETHASONE SODIUM PHOSPHATE 8 MG: 4 INJECTION, SOLUTION INTRA-ARTICULAR; INTRALESIONAL; INTRAMUSCULAR; INTRAVENOUS; SOFT TISSUE at 02:12

## 2022-12-01 RX ADMIN — FENTANYL CITRATE 50 MCG: 50 INJECTION INTRAMUSCULAR; INTRAVENOUS at 02:12

## 2022-12-01 RX ADMIN — LIDOCAINE HYDROCHLORIDE 100 MG: 20 INJECTION, SOLUTION EPIDURAL; INFILTRATION; INTRACAUDAL; PERINEURAL at 02:12

## 2022-12-01 RX ADMIN — SODIUM CHLORIDE: 9 INJECTION, SOLUTION INTRAVENOUS at 09:12

## 2022-12-01 RX ADMIN — ONDANSETRON 8 MG: 4 TABLET, ORALLY DISINTEGRATING ORAL at 04:12

## 2022-12-01 RX ADMIN — CEFAZOLIN 2 G: 1 INJECTION, POWDER, FOR SOLUTION INTRAMUSCULAR; INTRAVENOUS; PARENTERAL at 02:12

## 2022-12-01 RX ADMIN — OXYCODONE HYDROCHLORIDE 10 MG: 5 TABLET ORAL at 04:12

## 2022-12-01 RX ADMIN — PROMETHAZINE HYDROCHLORIDE 6.25 MG: 25 INJECTION INTRAMUSCULAR; INTRAVENOUS at 02:12

## 2022-12-01 RX ADMIN — MEPERIDINE HYDROCHLORIDE 25 MG: 25 INJECTION INTRAMUSCULAR; INTRAVENOUS; SUBCUTANEOUS at 02:12

## 2022-12-01 RX ADMIN — ONDANSETRON 8 MG: 2 INJECTION INTRAMUSCULAR; INTRAVENOUS at 02:12

## 2022-12-01 NOTE — BRIEF OP NOTE
"[facility] - Orthopedic Periop Services  Brief Operative Note    Surgery Date: 12/1/2022     Surgeon(s) and Role:     * Domo Alegria MD - Primary    Assisting Surgeon: None    Pre-op Diagnosis:  Acute medial meniscus tear of right knee, initial encounter [S83.241A]  Internal derangement of right knee [M23.91]    Post-op Diagnosis:  Post-Op Diagnosis Codes:     * Acute medial meniscus tear of right knee, initial encounter [S83.241A]     * Internal derangement of right knee [M23.91]    Procedure(s) (LRB):  ARTHROSCOPY, KNEE WITH SHAVING OF MEDIAL FEMORAL CONDYLE (Right)  ARTHROSCOPY, KNEE, WITH MEDIAL MENISCECTOMY (Right)  EXCISION, PLICA, KNEE, ARTHROSCOPIC (Right)    Anesthesia: general  Description of the findings of the procedure(s): See Op Note     Estimated Blood Loss: * No values recorded between 12/1/2022  2:37 PM and 12/1/2022  3:11 PM *         Specimens:   Specimen (24h ago, onward)      None              Discharge Note    OUTCOME: Patient tolerated treatment/procedure well without complication and is now ready for discharge.    DISPOSITION: Home or Self Care    FINAL DIAGNOSIS:  Acute medial meniscus tear of right knee    FOLLOWUP: In clinic    DISCHARGE INSTRUCTIONS:    Discharge Procedure Orders   CRUTCHES FOR HOME USE     Order Specific Question Answer Comments   Type: Axillary    Height: 5' 5" (1.651 m)    Weight: 88.9 kg (196 lb)    Length of need (1-99 months): 2      Diet general     Keep surgical extremity elevated     Ice to affected area   Order Comments: using barrier between ice and skin (specify duration&frequency)     Remove dressing in 72 hours   Order Comments: Keep dressing in place for 72 hours     Change dressing (specify)   Order Comments: Dressing change: one time per day beginning 72 hours post op.     Call MD for:  temperature >100.4     Call MD for:  persistent nausea and vomiting     Call MD for:  severe uncontrolled pain     Call MD for:  difficulty breathing, headache or " visual disturbances     Call MD for:  redness, tenderness, or signs of infection (pain, swelling, redness, odor or green/yellow discharge around incision site)     Call MD for:  hives     Call MD for:  persistent dizziness or light-headedness     Call MD for:  extreme fatigue     Activity as tolerated     Shower on day dressing removed (No bath)     Weight bearing as tolerated

## 2022-12-01 NOTE — HPI
Chief complaint:  Right knee pain   History:    Sandra Pérez is a 52 y.o. female seen  for recheck of her right knee.  She continues to be symptomatic with predominance of medial knee pain.  She had aspiration of 45 cc from her right knee last clinic visit.  Swelling has recurred.  X-rays right knee 10/27/2022 shows the bones well mineralized.  There is mild narrowing of medial joint space.  No osteophyte formation.  No evidence of acute fracture, dislocation or pathologic bone.  MRI examination of the right knee 11/08/2022 shows vertical tear of the posterior horn of the medial meniscus with degenerative tearing of the midbody region.  There is early DJD involving the medial compartment.  Moderately large effusion was present..  Impression:  Internal derangement-right knee  Plan:  Right knee arthroscopy planned.  Potential benefits and risks of surgery outlined to include but not limited to bleeding infection, damage to blood vessels and nerves, need for further surgery, other risks and complications including even death the patient wished to proceed.

## 2022-12-01 NOTE — PT/OT/SLP EVAL
Physical Therapy Evaluation    Patient Name:  Sandra Pérez   MRN:  [id number]    Recommendations:     Discharge Recommendations:  home   Discharge Equipment Recommendations: crutches   Barriers to discharge: None    Assessment:     Sandra Pérez is a 52 y.o. female admitted with a medical diagnosis of Acute medial meniscus tear of right knee.  She presents with the following impairments/functional limitations:  decreased ROM, gait instability Patient with good understanding of post op education.    Rehab Prognosis: Good; patient would benefit from acute skilled PT services to address these deficits and reach maximum level of function.    Recent Surgery: Procedure(s) (LRB):  ARTHROSCOPY, KNEE WITH SHAVING OF MEDIAL FEMORAL CONDYLE (Right)  ARTHROSCOPY, KNEE, WITH MEDIAL MENISCECTOMY (Right)  EXCISION, PLICA, KNEE, ARTHROSCOPIC (Right) Day of Surgery    Plan:     During this hospitalization, patient to be seen 1 x/week to address the identified rehab impairments via gait training, therapeutic activities and progress toward the following goals:    Plan of Care Expires:  12/01/22    Subjective     Chief Complaint: post op pain  Patient/Family Comments/goals: plans on dc home today  Pain/Comfort:  Pain Rating 1: 5/10  Location - Side 1: Right  Location 1: knee  Pain Addressed 1: Cessation of Activity    Patients cultural, spiritual, Nondenominational conflicts given the current situation:      Living Environment:  Lives with family  Prior to admission, patients level of function was independent.  Equipment used at home: none.  DME owned (not currently used): none.  Upon discharge, patient will have assistance from family.    Objective:     Communicated with nurse prior to session.  Patient found supine with    upon PT entry to room.    General Precautions: Standard,     Orthopedic Precautions:RLE partial weight bearing   Braces:    Respiratory Status: Room air    Exams:  na    Functional Mobility:  Gait: ambulated 20 feet  with crutches pwb      AM-PAC 6 CLICK MOBILITY  Total Score:24       Treatment & Education:  HEP: ascope protocol    Patient left supine with call button in reach.    GOALS:   Multidisciplinary Problems       Physical Therapy Goals       Not on file              Multidisciplinary Problems (Resolved)          Problem: Physical Therapy    Goal Priority Disciplines Outcome Goal Variances Interventions   Physical Therapy Goal   (Resolved)     PT, PT/OT Met     Description: Short Term Goals  Independent with HEP  Independent with crutchesx 10 feet FWB/WBAT: right lower extremity    Long term goals  Needed equipment for home.                            History:     Past Medical History:   Diagnosis Date    Hyperlipemia     Hypertension     PONV (postoperative nausea and vomiting)     Pulmonary edema        Past Surgical History:   Procedure Laterality Date    BILATERAL TUBAL LIGATION      BUNIONECTOMY      CHOLECYSTECTOMY         Time Tracking:     PT Received On: 12/01/22  PT Start Time: 1720     PT Stop Time: 1745  PT Total Time (min): 25 min     Billable Minutes: Evaluation 20      12/01/2022

## 2022-12-01 NOTE — SUBJECTIVE & OBJECTIVE
Past Medical History:   Diagnosis Date    Hyperlipemia     Hypertension     PONV (postoperative nausea and vomiting)     Pulmonary edema        Past Surgical History:   Procedure Laterality Date    BILATERAL TUBAL LIGATION      BUNIONECTOMY      CHOLECYSTECTOMY         Review of patient's allergies indicates:   Allergen Reactions    Shellfish containing products Edema    Lortab [hydrocodone-acetaminophen] Nausea And Vomiting       No current facility-administered medications for this encounter.     Current Outpatient Medications   Medication Sig    amLODIPine (NORVASC) 10 MG tablet Take 1 tablet (10 mg total) by mouth once daily.    aspirin (ECOTRIN) 81 MG EC tablet Take 1 tablet (81 mg total) by mouth once daily.    cetirizine (ZYRTEC) 10 MG tablet Take 1 tablet (10 mg total) by mouth once daily.    cyclobenzaprine (FLEXERIL) 10 MG tablet Take 1 tablet (10 mg total) by mouth 3 (three) times daily as needed.    ergocalciferol (ERGOCALCIFEROL) 50,000 unit Cap Take one capsule weekly for 12 weeks then reduce to one capsule monthly    fexofenadine (ALLEGRA ALLERGY) 60 MG tablet Take 1 tablet (60 mg total) by mouth once daily.    fluticasone propionate (FLONASE) 50 mcg/actuation nasal spray 2 sprays (100 mcg total) by Each Nostril route 2 (two) times daily.    hydroCHLOROthiazide (HYDRODIURIL) 12.5 MG Tab Take 1 tablet (12.5 mg total) by mouth once daily.    pravastatin (PRAVACHOL) 10 MG tablet Take 1 tablet (10 mg total) by mouth once daily.    sumatriptan (IMITREX) 100 MG tablet Take one tablet at the onset of headache, may repeat dose in 2 hours, no more than 2 in a day or 2 days in a week (Patient not taking: Reported on 10/11/2022)    topiramate (TOPAMAX) 100 MG tablet Take one tablet at bedtime (Patient not taking: Reported on 10/11/2022)     Family History       Problem Relation (Age of Onset)    Diabetes Mother    Hypertension Mother, Sister    No Known Problems Father          Tobacco Use    Smoking status:  Never    Smokeless tobacco: Never   Substance and Sexual Activity    Alcohol use: Yes     Comment: occasionally    Drug use: Never    Sexual activity: Yes     Partners: Male     Review of Systems   Constitutional: Negative.   Objective:     Vital Signs (Most Recent):    Vital Signs (24h Range):  BP: ()/()   Arterial Line BP: ()/()            There is no height or weight on file to calculate BMI.    No intake or output data in the 24 hours ending 22 2115    General    Vitals reviewed.  Constitutional: She is oriented to person, place, and time. She appears well-developed and well-nourished.   HENT:   Head: Normocephalic and atraumatic.   Eyes: EOM are normal. Pupils are equal, round, and reactive to light.   Cardiovascular:  Normal rate, regular rhythm and normal heart sounds.            Pulmonary/Chest: Effort normal and breath sounds normal.   Abdominal: Soft. Bowel sounds are normal.   Neurological: She is alert and oriented to person, place, and time. She has normal reflexes.   Psychiatric: She has a normal mood and affect. Her behavior is normal.     General Musculoskeletal Exam   Gait: antalgic       Right Knee Exam     Inspection   Effusion: present    Tenderness   The patient is tender to palpation of the medial joint line.    Range of Motion   The patient has normal right knee ROM.  Extension:  normal   Flexion:  normal     Tests   Meniscus   Iker:  Medial - positive   Ligament Examination   Lachman: normal (-1 to 2mm)   PCL-Posterior Drawer: normal (0 to 2mm)     MCL - Valgus: normal (0 to 2mm)  LCL - Varus: normal  Pivot Shift: normal (Equal)    Other   Sensation: normal    Left Knee Exam   Left knee exam is normal.    Muscle Strength   Right Lower Extremity   Quadriceps:  5/5   Hamstrin/5     Vascular Exam     Right Pulses  Dorsalis Pedis:      2+          Significant Labs: All pertinent labs within the past 24 hours have been reviewed.    Significant Imaging: I have reviewed all pertinent  imaging results/findings.

## 2022-12-01 NOTE — ANESTHESIA PREPROCEDURE EVALUATION
12/01/2022  Sandra Pérez is a 52 y.o., female.      Pre-op Assessment    I have reviewed the Patient Summary Reports.    I have reviewed the NPO Status.   I have reviewed the Medications.     Review of Systems         Anesthesia Plan  Type of Anesthesia, risks & benefits discussed:    Anesthesia Type: Gen Supraglottic Airway  Intra-op Monitoring Plan: Standard ASA Monitors  Post Op Pain Control Plan: IV/PO Opioids PRN  Induction:  IV  Informed Consent: Informed consent signed with the Patient and all parties understand the risks and agree with anesthesia plan.  All questions answered.   ASA Score: 2    Ready For Surgery From Anesthesia Perspective.     .  NPO greater than 8 hours  PONV  Allergies      Shellfish Containing Products Shellfish Containing Products Edema High  6/17/2021   Deletion Reason:    Adverse Reactions/Drug Intolerances      Lortab [Hydrocodone-acetaminophen] Lortab [Hydrocodone-acetaminophen] Nausea And Vomiting Low Intolerance 6/17/2021     Hct 41    Medical History   Hypertension Hyperlipemia   Pulmonary edema    H/o migraines    Airway exam deferred (COVID precautions); adequate ROM at neck.

## 2022-12-01 NOTE — OP NOTE
[facility], [place] - Orthopedic Periop Services  Surgery Department  Operative Note    SUMMARY     Date of Procedure: 12/1/2022     Procedure: Procedure(s) (LRB):  ARTHROSCOPY, KNEE WITH SHAVING OF MEDIAL FEMORAL CONDYLE (Right)  ARTHROSCOPY, KNEE, WITH MEDIAL MENISCECTOMY (Right)  EXCISION, PLICA, KNEE, ARTHROSCOPIC (Right)     Surgeon(s) and Role:     * Domo Alegria MD - Primary    Assisting Surgeon: None    Pre-Operative Diagnosis: Acute medial meniscus tear of right knee, initial encounter [S83.241A]  Internal derangement of right knee [M23.91]    Post-Operative Diagnosis: Post-Op Diagnosis Codes:     * Acute medial meniscus tear of right knee, initial encounter [S83.241A]     * Internal derangement of right knee [M23.91]    Anesthesia: general    Technical Procedures Used:            DEPARTMENT OF ORTHOPEDIC SURGERY                OPERATIVE REPORT     NAME:  Sandra Pérez  MRN: [id number]               DATE OF SURGERY:  12/01/2022     PREOPERATIVE DIAGNOSIS:  right knee internal derangement    POSTOPERATIVE DIAGNOSIS:  M SP enlargement, 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    PROCEDURE:  Examination under anesthesia, arthroscopy with intraarticular probing, M SP excision, shaving medial femoral condyle, partial medial meniscectomy-right knee    PROCEDURE IN DETAIL:  The patient was taken to the operating room and placed in the supine position.  After adequate level of general anesthesia had been achieved (See Anesthesia Note) patients right knee was examined.  There was 1+ intraarticular effusion.  Knee range of motion was 0-125 degrees of flexion.  Lachman exam was negative as is the FRD.  There is no medial or ligamentous instability appreciated.  Iker test produced intermittent popping felt to emanate from the lateral aspect of the joint.  Now, the rightlower  extremity was scrubbed with Betadine and draped in sterile fashion.  The right lower extremity was elevated, exsanguinated with a Naveed bandage.  A pneumatic tourniquet about the upper thigh, to pressure of 300 millimeters of Mercury.  The operation was begun by making a small stab wound about the superolateral aspect of the right patella.  A trocar was introduced into the suprapatellar pouch and the knee was distended with sterile saline.  Second and third stab wounds were made about the medial and lateral aspects of the patellar tendon for introduction of the arthroscopy camera and intraarticular probe.  Now a systematic evaluation of the knee was carried out.  Inspection suprapatellar pouch was unremarkable.  Exam of the patellofemoral joint showed an area of grade 1-2/4 degenerative changes involving the articular surface of the patella.  The patella otherwise tracked well in the midline.  Lateral gutter was unremarkable with no loose bodies encountered.  Exam of the medial joint showed a large medial synovial plica.  This was excised with a shaver and contoured with the radiofrequency Wand.  There was grade 2 to 3/4 DJD involving weight-bearing articular surface of the medial femoral condyle.  This area was lightly debrided with shaver.  A degenerative tear of the posterior horn of the medial meniscus was present.  The portion of meniscus was excised and the edges were smoothed with a shaver and contoured with the radiofrequency Wand.  Exam of the intercondylar notch showed an intact anterior and posterior cruciate ligament.  Exam of the lateral compartment showed normal femoral tibial cartilage and intact lateral meniscus.    Now, the tourniquet was let down.  Hemostasis was achieved with Bovie electrocautery.  Knee joint was irrigated copiously with antibiotic solution and arthroscope withdrawn.  The stab wounds were reapproximated with interrupted 4-0 suture.  The wounds were dressed sterilely.  The patient  was taken to the recovery room in satisfactory condition.  ESTIMATED BLOOD LOSS:  5ccs.   Tourniquet time: 18 minutes.  The patient received Ancef antibiotic intravenously prior to the procedure.      Domo Alegria       Complications: No    Estimated Blood Loss (EBL): * No values recorded between 12/1/2022  2:37 PM and 12/1/2022  3:11 PM *           Implants: * No implants in log *    Specimens:   Specimen (24h ago, onward)      None                    Condition: Good    Disposition: PACU - hemodynamically stable.    Attestation: I was present and scrubbed for the entire procedure.

## 2022-12-01 NOTE — INTERVAL H&P NOTE
The patient has been examined and the H&P has been reviewed:  I concur with the findings and no changes have occurred since H&P was written.    Surgery risks, benefits and alternative options discussed and understood by patient/family.          Active Hospital Problems    Diagnosis  POA    *Acute medial meniscus tear of right knee [S83.105A]  Yes     Chronic      Resolved Hospital Problems   No resolved problems to display.

## 2022-12-01 NOTE — OR NURSING
1513 REC'D TO PACU ASLEEP. NAD NOTED. RESP EVEN & UNLABORED. O2 SAT 99% ON 5L/FM, WILL TITRATE AS NEEDED. VSS. NO S/S OF PAIN NOTED. DSG TO RT LEG D/I. RFA 20G INTACT & INFUSING IVF W/O SIGNS OF INFILTRATION NOTED. WILL CONT TO MONITOR.     1538 UPDATED MOTHER ON PT STATUS VIA PHONE.     1555 RETURNED TO ROOM #21 & RELEASED TO RICARDO GONZALES RN IN STABLE. /80 HR 83 O2 SAT 99% ON RA.

## 2022-12-01 NOTE — INTERVAL H&P NOTE
The patient has been examined and the H&P has been reviewed:    I concur with the findings and no changes have occurred since H&P was written.    Surgery risks, benefits and alternative options discussed and understood by patient/family.          Active Hospital Problems    Diagnosis  POA    *Acute medial meniscus tear of right knee [S83.343A]  Yes     Chronic      Resolved Hospital Problems   No resolved problems to display.

## 2022-12-01 NOTE — DISCHARGE SUMMARY
"[facility] - Orthopedic Periop Services  Discharge Note  Short Stay    Procedure(s) (LRB):  ARTHROSCOPY, KNEE WITH SHAVING OF MEDIAL FEMORAL CONDYLE (Right)  ARTHROSCOPY, KNEE, WITH MEDIAL MENISCECTOMY (Right)  EXCISION, PLICA, KNEE, ARTHROSCOPIC (Right)      OUTCOME: Patient tolerated treatment/procedure well without complication and is now ready for discharge.    DISPOSITION: Home or Self Care    FINAL DIAGNOSIS:  Acute medial meniscus tear of right knee    FOLLOWUP: In clinic    DISCHARGE INSTRUCTIONS:    Discharge Procedure Orders   CRUTCHES FOR HOME USE     Order Specific Question Answer Comments   Type: Axillary    Height: 5' 5" (1.651 m)    Weight: 88.9 kg (196 lb)    Length of need (1-99 months): 2      Diet general     Keep surgical extremity elevated     Ice to affected area   Order Comments: using barrier between ice and skin (specify duration&frequency)     Remove dressing in 72 hours   Order Comments: Keep dressing in place for 72 hours     Change dressing (specify)   Order Comments: Dressing change: one time per day beginning 72 hours post op.     Call MD for:  temperature >100.4     Call MD for:  persistent nausea and vomiting     Call MD for:  severe uncontrolled pain     Call MD for:  difficulty breathing, headache or visual disturbances     Call MD for:  redness, tenderness, or signs of infection (pain, swelling, redness, odor or green/yellow discharge around incision site)     Call MD for:  hives     Call MD for:  persistent dizziness or light-headedness     Call MD for:  extreme fatigue     Activity as tolerated     Shower on day dressing removed (No bath)     Weight bearing as tolerated        TIME SPENT ON DISCHARGE: 15 minutes  "

## 2022-12-01 NOTE — H&P
[facility] - Orthopedic Periop Services  Orthopedics  H&P    Patient Name: Sandra Pérez  MRN: [id number]  Admission Date: (Not on file)  Primary Care Provider: Sravani Montague NP    Patient information was obtained from patient and ER records.     Subjective:     Principal Problem:Acute medial meniscus tear of right knee    Chief Complaint: No chief complaint on file.       HPI:   Chief complaint:  Right knee pain   History:    Sandra Pérez is a 52 y.o. female seen  for recheck of her right knee.  She continues to be symptomatic with predominance of medial knee pain.  She had aspiration of 45 cc from her right knee last clinic visit.  Swelling has recurred.  X-rays right knee 10/27/2022 shows the bones well mineralized.  There is mild narrowing of medial joint space.  No osteophyte formation.  No evidence of acute fracture, dislocation or pathologic bone.  MRI examination of the right knee 11/08/2022 shows vertical tear of the posterior horn of the medial meniscus with degenerative tearing of the midbody region.  There is early DJD involving the medial compartment.  Moderately large effusion was present..  Impression:  Internal derangement-right knee  Plan:  Right knee arthroscopy planned.  Potential benefits and risks of surgery outlined to include but not limited to bleeding infection, damage to blood vessels and nerves, need for further surgery, other risks and complications including even death the patient wished to proceed.              Past Medical History:   Diagnosis Date    Hyperlipemia     Hypertension     PONV (postoperative nausea and vomiting)     Pulmonary edema        Past Surgical History:   Procedure Laterality Date    BILATERAL TUBAL LIGATION      BUNIONECTOMY      CHOLECYSTECTOMY         Review of patient's allergies indicates:   Allergen Reactions    Shellfish containing products Edema    Lortab [hydrocodone-acetaminophen] Nausea And Vomiting       No current  facility-administered medications for this encounter.     Current Outpatient Medications   Medication Sig    amLODIPine (NORVASC) 10 MG tablet Take 1 tablet (10 mg total) by mouth once daily.    aspirin (ECOTRIN) 81 MG EC tablet Take 1 tablet (81 mg total) by mouth once daily.    cetirizine (ZYRTEC) 10 MG tablet Take 1 tablet (10 mg total) by mouth once daily.    cyclobenzaprine (FLEXERIL) 10 MG tablet Take 1 tablet (10 mg total) by mouth 3 (three) times daily as needed.    ergocalciferol (ERGOCALCIFEROL) 50,000 unit Cap Take one capsule weekly for 12 weeks then reduce to one capsule monthly    fexofenadine (ALLEGRA ALLERGY) 60 MG tablet Take 1 tablet (60 mg total) by mouth once daily.    fluticasone propionate (FLONASE) 50 mcg/actuation nasal spray 2 sprays (100 mcg total) by Each Nostril route 2 (two) times daily.    hydroCHLOROthiazide (HYDRODIURIL) 12.5 MG Tab Take 1 tablet (12.5 mg total) by mouth once daily.    pravastatin (PRAVACHOL) 10 MG tablet Take 1 tablet (10 mg total) by mouth once daily.    sumatriptan (IMITREX) 100 MG tablet Take one tablet at the onset of headache, may repeat dose in 2 hours, no more than 2 in a day or 2 days in a week (Patient not taking: Reported on 10/11/2022)    topiramate (TOPAMAX) 100 MG tablet Take one tablet at bedtime (Patient not taking: Reported on 10/11/2022)     Family History       Problem Relation (Age of Onset)    Diabetes Mother    Hypertension Mother, Sister    No Known Problems Father          Tobacco Use    Smoking status: Never    Smokeless tobacco: Never   Substance and Sexual Activity    Alcohol use: Yes     Comment: occasionally    Drug use: Never    Sexual activity: Yes     Partners: Male     Review of Systems   Constitutional: Negative.   Objective:     Vital Signs (Most Recent):    Vital Signs (24h Range):  BP: ()/()   Arterial Line BP: ()/()            There is no height or weight on file to calculate BMI.    No intake or output data in the  24 hours ending 22    General    Vitals reviewed.  Constitutional: She is oriented to person, place, and time. She appears well-developed and well-nourished.   HENT:   Head: Normocephalic and atraumatic.   Eyes: EOM are normal. Pupils are equal, round, and reactive to light.   Cardiovascular:  Normal rate, regular rhythm and normal heart sounds.            Pulmonary/Chest: Effort normal and breath sounds normal.   Abdominal: Soft. Bowel sounds are normal.   Neurological: She is alert and oriented to person, place, and time. She has normal reflexes.   Psychiatric: She has a normal mood and affect. Her behavior is normal.     General Musculoskeletal Exam   Gait: antalgic       Right Knee Exam     Inspection   Effusion: present    Tenderness   The patient is tender to palpation of the medial joint line.    Range of Motion   The patient has normal right knee ROM.  Extension:  normal   Flexion:  normal     Tests   Meniscus   Iker:  Medial - positive   Ligament Examination   Lachman: normal (-1 to 2mm)   PCL-Posterior Drawer: normal (0 to 2mm)     MCL - Valgus: normal (0 to 2mm)  LCL - Varus: normal  Pivot Shift: normal (Equal)    Other   Sensation: normal    Left Knee Exam   Left knee exam is normal.    Muscle Strength   Right Lower Extremity   Quadriceps:  5/5   Hamstrin/5     Vascular Exam     Right Pulses  Dorsalis Pedis:      2+          Significant Labs: All pertinent labs within the past 24 hours have been reviewed.    Significant Imaging: I have reviewed all pertinent imaging results/findings.    Assessment/Plan:     No notes have been filed under this hospital service.  Service: Orthopedic Surgery      Domo Alegria MD  Orthopedics  [facility] - Orthopedic Periop Services

## 2022-12-01 NOTE — TRANSFER OF CARE
"Anesthesia Transfer of Care Note    Patient: Sandra Pérez    Procedure(s) Performed: Procedure(s) (LRB):  ARTHROSCOPY, KNEE WITH SHAVING OF MEDIAL FEMORAL CONDYLE (Right)  ARTHROSCOPY, KNEE, WITH MEDIAL MENISCECTOMY (Right)  EXCISION, PLICA, KNEE, ARTHROSCOPIC (Right)    Patient location: PACU    Anesthesia Type: general    Transport from OR: Transported from OR on 100% O2 by closed face mask with adequate spontaneous ventilation    Post pain: adequate analgesia    Post assessment: no apparent anesthetic complications    Post vital signs: stable    Level of consciousness: responds to stimulation    Nausea/Vomiting: no nausea/vomiting    Complications: none    Transfer of care protocol was followed      Last vitals:   Visit Vitals  /71 (BP Location: Left arm, Patient Position: Lying)   Pulse 90   Temp 36.6 °C (97.8 °F) (Oral)   Resp (!) 22   Ht 5' 5" (1.651 m)   Wt 88.9 kg (196 lb)   SpO2 99%   Breastfeeding No   BMI 32.62 kg/m²     "

## 2022-12-01 NOTE — ANESTHESIA PROCEDURE NOTES
Intubation    Date/Time: 12/1/2022 2:05 PM  Performed by: Julisa Carlin CRNA  Authorized by: Dewayne Ty MD     Intubation:     Induction:  Intravenous    Intubated:  Postinduction    Mask Ventilation:  Easy with oral airway    Attempts:  1    Attempted By:  CRNA    Difficult Airway Encountered?: No      Complications:  None    Airway Device:  Supraglottic airway/LMA    Airway Device Size:  4.0    Style/Cuff Inflation:  Cuffed (inflated to minimal occlusive pressure)    Placement Verified By:  Capnometry    Complicating Factors:  None    Findings Post-Intubation:  BS equal bilateral

## 2022-12-01 NOTE — INTERVAL H&P NOTE
The patient has been examined and the H&P has been reviewed:    I concur with the findings and no changes have occurred since H&P was written.    Surgery risks, benefits and alternative options discussed and understood by patient/family.          Active Hospital Problems    Diagnosis  POA    *Acute medial meniscus tear of right knee [S83.805A]  Yes     Chronic      Resolved Hospital Problems   No resolved problems to display.

## 2022-12-02 NOTE — ANESTHESIA POSTPROCEDURE EVALUATION
Anesthesia Post Evaluation    Patient: Sandra Pérez    Procedure(s) Performed: Procedure(s) (LRB):  ARTHROSCOPY, KNEE WITH SHAVING OF MEDIAL FEMORAL CONDYLE (Right)  ARTHROSCOPY, KNEE, WITH MEDIAL MENISCECTOMY (Right)  EXCISION, PLICA, KNEE, ARTHROSCOPIC (Right)    Final Anesthesia Type: general      Patient location during evaluation: PACU  Patient participation: Yes- Able to Participate  Level of consciousness: awake and sedated  Post-procedure vital signs: reviewed and stable  Pain management: adequate  Airway patency: patent    PONV status at discharge: No PONV  Anesthetic complications: no      Cardiovascular status: blood pressure returned to baseline  Respiratory status: unassisted  Hydration status: euvolemic  Follow-up not needed.          Vitals Value Taken Time   /69 12/01/22 1739   Temp 36.7 °C (98 °F) 12/01/22 1739   Pulse 88 12/01/22 1715   Resp 16 12/01/22 1602   SpO2 96 % 12/01/22 1715         Event Time   Out of Recovery 15:55:00         Pain/Franco Score: Pain Rating Prior to Med Admin: 7 (12/1/2022  4:02 PM)  Franco Score: 10 (12/1/2022  5:39 PM)  Modified Franco Score: 19 (12/1/2022  5:39 PM)

## 2023-01-12 ENCOUNTER — OFFICE VISIT (OUTPATIENT)
Dept: ORTHOPEDICS | Facility: CLINIC | Age: 53
End: 2023-01-12
Payer: COMMERCIAL

## 2023-01-12 DIAGNOSIS — Z98.890 S/P ARTHROSCOPY OF RIGHT KNEE: Primary | ICD-10-CM

## 2023-01-12 PROCEDURE — 99213 OFFICE O/P EST LOW 20 MIN: CPT | Mod: PBBFAC | Performed by: NURSE PRACTITIONER

## 2023-01-12 PROCEDURE — 99024 POSTOP FOLLOW-UP VISIT: CPT | Mod: ,,, | Performed by: NURSE PRACTITIONER

## 2023-01-12 PROCEDURE — 1160F PR REVIEW ALL MEDS BY PRESCRIBER/CLIN PHARMACIST DOCUMENTED: ICD-10-PCS | Mod: ,,, | Performed by: NURSE PRACTITIONER

## 2023-01-12 PROCEDURE — 1159F MED LIST DOCD IN RCRD: CPT | Mod: ,,, | Performed by: NURSE PRACTITIONER

## 2023-01-12 PROCEDURE — 1159F PR MEDICATION LIST DOCUMENTED IN MEDICAL RECORD: ICD-10-PCS | Mod: ,,, | Performed by: NURSE PRACTITIONER

## 2023-01-12 PROCEDURE — 99024 PR POST-OP FOLLOW-UP VISIT: ICD-10-PCS | Mod: ,,, | Performed by: NURSE PRACTITIONER

## 2023-01-12 PROCEDURE — 1160F RVW MEDS BY RX/DR IN RCRD: CPT | Mod: ,,, | Performed by: NURSE PRACTITIONER

## 2023-01-12 NOTE — PATIENT INSTRUCTIONS
Safety guidelines and activity restrictions are discussed with the patient.  She verbalized understanding.  She is given work release to return to work on 01/16/2023 without restrictions.  She is return orthopedic clinic on a as needed basis.

## 2023-01-12 NOTE — PROGRESS NOTES
52-year-old female presents ambulatory orthopedic clinic re-evaluation of her right knee.  She is known to orthopedic clinic having undergone right knee arthroscopy on 12/01/2022 at which time she had shaving of the medial femoral condyle partial meniscectomy as well excision of a medial synovial plica.  She states on last visit of 12/08/2022 that she continued to have some mild discomfort however she did not require further pain medication.  She was given a prescription for outpatient physical therapy.  She states that she made 1 visit to the therapist and therapist told her that she was doing so well there is no need to come back.  Reports she feels she is doing very well.  Denies pain or discomfort.  States she feels she is ready to return to work on 01/16/2023 without restrictions.      PE: She is noted be ambulating independently no perceptible limp.  Physical exam right knee skin is warm, dry and intact.  No soft tissue swelling is noted.  No intra-articular effusion appreciated clinically.  Portal sites are noted.  Portal sites well healed without sign or symptom flexion.  Range of motion of her right knee 0° extension further flexion approximately 130°.  There was excellent ligamentous balance instability noted clinically.      Impression:  6 weeks following right knee arthroscopy    Plan:  Safety guidelines and activity restrictions are discussed with the patient.  She verbalized understanding.  She is given work release to return to work on 01/16/2023 without restrictions.  She is return orthopedic clinic on a as needed basis.

## 2023-01-12 NOTE — LETTER
January 12, 2023      [facility]  [address] [place] [id number]  Phone: [phone]  Fax: [phone]       Patient: Sandra Pérez   YOB: 1970  Date of Visit: 01/12/2023    To Whom It May Concern:    Keri Pérez  was at [facility] on 01/12/2023. The patient may return to work/school on 1/16/23 with no restrictions. If you have any questions or concerns, or if I can be of further assistance, please do not hesitate to contact me.    Sincerely,    TAMMY Mcclendon/Chapis Adkins

## 2023-03-08 RX ORDER — ERGOCALCIFEROL 1.25 MG/1
CAPSULE ORAL
Qty: 15 CAPSULE | Refills: 1 | Status: CANCELLED | OUTPATIENT
Start: 2023-03-08

## 2023-03-09 ENCOUNTER — OFFICE VISIT (OUTPATIENT)
Dept: FAMILY MEDICINE | Facility: CLINIC | Age: 53
End: 2023-03-09
Payer: COMMERCIAL

## 2023-03-09 VITALS
WEIGHT: 194 LBS | HEIGHT: 65 IN | BODY MASS INDEX: 32.32 KG/M2 | HEART RATE: 81 BPM | SYSTOLIC BLOOD PRESSURE: 134 MMHG | TEMPERATURE: 98 F | RESPIRATION RATE: 16 BRPM | DIASTOLIC BLOOD PRESSURE: 89 MMHG

## 2023-03-09 DIAGNOSIS — E78.5 HYPERLIPIDEMIA, UNSPECIFIED HYPERLIPIDEMIA TYPE: ICD-10-CM

## 2023-03-09 DIAGNOSIS — I10 HYPERTENSION, UNSPECIFIED TYPE: Primary | ICD-10-CM

## 2023-03-09 DIAGNOSIS — E55.9 VITAMIN D DEFICIENCY: ICD-10-CM

## 2023-03-09 DIAGNOSIS — J30.89 ALLERGIC RHINITIS DUE TO OTHER ALLERGIC TRIGGER, UNSPECIFIED SEASONALITY: ICD-10-CM

## 2023-03-09 PROCEDURE — 1159F PR MEDICATION LIST DOCUMENTED IN MEDICAL RECORD: ICD-10-PCS | Mod: CPTII,,, | Performed by: NURSE PRACTITIONER

## 2023-03-09 PROCEDURE — 1159F MED LIST DOCD IN RCRD: CPT | Mod: CPTII,,, | Performed by: NURSE PRACTITIONER

## 2023-03-09 PROCEDURE — 1160F RVW MEDS BY RX/DR IN RCRD: CPT | Mod: CPTII,,, | Performed by: NURSE PRACTITIONER

## 2023-03-09 PROCEDURE — 3075F SYST BP GE 130 - 139MM HG: CPT | Mod: CPTII,,, | Performed by: NURSE PRACTITIONER

## 2023-03-09 PROCEDURE — 1160F PR REVIEW ALL MEDS BY PRESCRIBER/CLIN PHARMACIST DOCUMENTED: ICD-10-PCS | Mod: CPTII,,, | Performed by: NURSE PRACTITIONER

## 2023-03-09 PROCEDURE — 3008F BODY MASS INDEX DOCD: CPT | Mod: CPTII,,, | Performed by: NURSE PRACTITIONER

## 2023-03-09 PROCEDURE — 3079F PR MOST RECENT DIASTOLIC BLOOD PRESSURE 80-89 MM HG: ICD-10-PCS | Mod: CPTII,,, | Performed by: NURSE PRACTITIONER

## 2023-03-09 PROCEDURE — 3008F PR BODY MASS INDEX (BMI) DOCUMENTED: ICD-10-PCS | Mod: CPTII,,, | Performed by: NURSE PRACTITIONER

## 2023-03-09 PROCEDURE — 3079F DIAST BP 80-89 MM HG: CPT | Mod: CPTII,,, | Performed by: NURSE PRACTITIONER

## 2023-03-09 PROCEDURE — 99213 PR OFFICE/OUTPT VISIT, EST, LEVL III, 20-29 MIN: ICD-10-PCS | Mod: ,,, | Performed by: NURSE PRACTITIONER

## 2023-03-09 PROCEDURE — 3075F PR MOST RECENT SYSTOLIC BLOOD PRESS GE 130-139MM HG: ICD-10-PCS | Mod: CPTII,,, | Performed by: NURSE PRACTITIONER

## 2023-03-09 PROCEDURE — 99213 OFFICE O/P EST LOW 20 MIN: CPT | Mod: ,,, | Performed by: NURSE PRACTITIONER

## 2023-03-09 RX ORDER — ERGOCALCIFEROL 1.25 MG/1
50000 CAPSULE ORAL
Qty: 15 CAPSULE | Refills: 1 | Status: SHIPPED | OUTPATIENT
Start: 2023-03-09 | End: 2023-08-14 | Stop reason: SDUPTHER

## 2023-03-09 RX ORDER — FLUTICASONE PROPIONATE 50 MCG
2 SPRAY, SUSPENSION (ML) NASAL 2 TIMES DAILY
Qty: 16 G | Refills: 1 | Status: SHIPPED | OUTPATIENT
Start: 2023-03-09

## 2023-03-09 RX ORDER — AMLODIPINE BESYLATE 10 MG/1
10 TABLET ORAL DAILY
Qty: 90 TABLET | Refills: 1 | Status: SHIPPED | OUTPATIENT
Start: 2023-03-09 | End: 2023-08-14 | Stop reason: SDUPTHER

## 2023-03-09 RX ORDER — CETIRIZINE HYDROCHLORIDE 10 MG/1
10 TABLET ORAL DAILY
Qty: 90 TABLET | Refills: 1 | Status: SHIPPED | OUTPATIENT
Start: 2023-03-09 | End: 2023-08-14 | Stop reason: SDUPTHER

## 2023-03-09 RX ORDER — FEXOFENADINE HCL 60 MG
60 TABLET ORAL DAILY
Qty: 90 TABLET | Refills: 1 | Status: SHIPPED | OUTPATIENT
Start: 2023-03-09 | End: 2023-08-14 | Stop reason: SDUPTHER

## 2023-03-09 RX ORDER — HYDROCHLOROTHIAZIDE 12.5 MG/1
12.5 TABLET ORAL DAILY
Qty: 90 TABLET | Refills: 1 | Status: SHIPPED | OUTPATIENT
Start: 2023-03-09 | End: 2023-08-14 | Stop reason: SDUPTHER

## 2023-03-09 RX ORDER — ASPIRIN 81 MG/1
81 TABLET ORAL DAILY
Qty: 90 TABLET | Refills: 1 | Status: SHIPPED | OUTPATIENT
Start: 2023-03-09 | End: 2023-08-14 | Stop reason: SDUPTHER

## 2023-03-09 RX ORDER — ERGOCALCIFEROL 1.25 MG/1
50000 CAPSULE ORAL
COMMUNITY
End: 2023-03-09

## 2023-03-09 RX ORDER — PRAVASTATIN SODIUM 10 MG/1
10 TABLET ORAL DAILY
Qty: 90 TABLET | Refills: 1 | Status: SHIPPED | OUTPATIENT
Start: 2023-03-09 | End: 2023-08-14 | Stop reason: SDUPTHER

## 2023-03-09 NOTE — PROGRESS NOTES
VANDANA Mathis   [facility] [address] [place] [address]  [phone]      PATIENT NAME: Sandra Pérez  : 1970  DATE: 3/9/23  MRN: [id number]      Billing Provider: VANDANA Mathis  Level of Service:   Patient PCP Information       Provider PCP Type    Sravani Montague NP General            Reason for Visit / Chief Complaint: Follow-up, Hypertension, and Hyperlipidemia       Update PCP  Update Chief Complaint         History of Present Illness / Problem Focused Workflow     Sandra Pérez presents to the clinic with Follow-up, Hypertension, and Hyperlipidemia     Pt presents for routine follow up with lab and med refills. Overall doing well.      BP appears  controlled today. Home meds reviewed  The current medical regimen is effective;  continue present plan and medications. Recommended patient to check home readings to monitor and see me for followup as scheduled or sooner as needed.   Discussed sodium restriction, maintaining ideal body weight and regular exercise program as physiologic means to continue to achieve blood pressure control in addition to medication compliance.  Patient was educated that both decongestant and anti-inflammatory medication may raise blood pressure.  Advised to monitor BP at home. Advised on optimal BP readings - SBP < 130 & DBP < 80. Advised to call office for any persistent BP elevation and may have to prescribe or adjust BP med(s).  Recommended DASH diet, stay well hydrated with water daily, eliminate or decrease caffeinated and high calorie drinks, increase physical activity, and lose weight if BMI > 25.0.        Review of Systems     Review of Systems   Constitutional:  Negative for fatigue and fever.   HENT:  Negative for nasal congestion and sore throat.    Eyes:  Negative for visual disturbance.   Respiratory:  Negative for chest tightness and  shortness of breath.    Cardiovascular:  Negative for chest pain and leg swelling.   Gastrointestinal:  Negative for abdominal pain, change in bowel habit and change in bowel habit.   Endocrine: Negative for polydipsia, polyphagia and polyuria.   Genitourinary:  Negative for dysuria and hematuria.   Musculoskeletal:  Negative for back pain and leg pain.   Integumentary:  Negative for rash.   Neurological:  Negative for dizziness, syncope, weakness and light-headedness.      Medical / Social / Family History     Past Medical History:   Diagnosis Date    Hyperlipemia     Hypertension     PONV (postoperative nausea and vomiting)     Pulmonary edema        Past Surgical History:   Procedure Laterality Date    ARTHROSCOPY OF KNEE Right 12/1/2022    Procedure: ARTHROSCOPY, KNEE WITH SHAVING OF MEDIAL FEMORAL CONDYLE;  Surgeon: Domo Alegria MD;  Location: [facility];  Service: Orthopedics;  Laterality: Right;    BILATERAL TUBAL LIGATION      BUNIONECTOMY      CHOLECYSTECTOMY      KNEE ARTHROSCOPY W/ MENISCECTOMY Right 12/1/2022    Procedure: ARTHROSCOPY, KNEE, WITH MEDIAL MENISCECTOMY;  Surgeon: Domo Alegria MD;  Location: [facility];  Service: Orthopedics;  Laterality: Right;    KNEE ARTHROSCOPY W/ PLICA EXCISION Right 12/1/2022    Procedure: EXCISION, PLICA, KNEE, ARTHROSCOPIC;  Surgeon: Domo Alegria MD;  Location: [facility];  Service: Orthopedics;  Laterality: Right;       Social History  Ms. Pérez  reports that she has never smoked. She has never used smokeless tobacco. She reports current alcohol use. She reports that she does not use drugs.    Family History  Ms. Pérez's family history includes Diabetes in her mother; Hypertension in her mother and sister; No Known Problems in her father.    Medications and Allergies     Medications  Outpatient Medications Marked as Taking for the 3/9/23 encounter (Office Visit) with VANDANA Mathis   Medication Sig Dispense  Refill    cyclobenzaprine (FLEXERIL) 10 MG tablet Take 1 tablet (10 mg total) by mouth 3 (three) times daily as needed. 45 tablet 1    [DISCONTINUED] aspirin (ECOTRIN) 81 MG EC tablet Take 1 tablet (81 mg total) by mouth once daily. 90 tablet 1    [DISCONTINUED] ergocalciferol (ERGOCALCIFEROL) 50,000 unit Cap Take 50,000 Units by mouth every 7 days.         Allergies  Review of patient's allergies indicates:   Allergen Reactions    Shellfish containing products Edema    Lortab [hydrocodone-acetaminophen] Nausea And Vomiting       Physical Examination     Vitals:    03/09/23 1555   BP: 134/89   Pulse:    Resp:    Temp:      Physical Exam  Eyes:      Pupils: Pupils are equal, round, and reactive to light.   Cardiovascular:      Rate and Rhythm: Normal rate and regular rhythm.      Heart sounds: No murmur heard.  Pulmonary:      Breath sounds: Normal breath sounds. No wheezing, rhonchi or rales.   Abdominal:      General: Bowel sounds are normal.   Musculoskeletal:         General: No swelling.      Cervical back: Normal range of motion and neck supple.   Neurological:      Mental Status: She is alert and oriented to person, place, and time.        Assessment and Plan (including Health Maintenance)      Problem List  Smart Sets  Document Outside HM   :    Plan:   1. Hypertension, unspecified type  -     amLODIPine (NORVASC) 10 MG tablet; Take 1 tablet (10 mg total) by mouth once daily.  Dispense: 90 tablet; Refill: 1  -     hydroCHLOROthiazide (HYDRODIURIL) 12.5 MG Tab; Take 1 tablet (12.5 mg total) by mouth once daily.  Dispense: 90 tablet; Refill: 1  -     aspirin (ECOTRIN) 81 MG EC tablet; Take 1 tablet (81 mg total) by mouth once daily.  Dispense: 90 tablet; Refill: 1    2. Hyperlipidemia, unspecified hyperlipidemia type  -     pravastatin (PRAVACHOL) 10 MG tablet; Take 1 tablet (10 mg total) by mouth once daily.  Dispense: 90 tablet; Refill: 1    3. Allergic rhinitis due to other allergic trigger, unspecified  seasonality  -     cetirizine (ZYRTEC) 10 MG tablet; Take 1 tablet (10 mg total) by mouth once daily.  Dispense: 90 tablet; Refill: 1  -     fexofenadine (ALLEGRA ALLERGY) 60 MG tablet; Take 1 tablet (60 mg total) by mouth once daily.  Dispense: 90 tablet; Refill: 1  -     fluticasone propionate (FLONASE) 50 mcg/actuation nasal spray; 2 sprays (100 mcg total) by Each Nostril route 2 (two) times daily.  Dispense: 16 g; Refill: 1    4. Vitamin D deficiency    Other orders  -     ergocalciferol (ERGOCALCIFEROL) 50,000 unit Cap; Take 1 capsule (50,000 Units total) by mouth every 7 days.  Dispense: 15 capsule; Refill: 1           Health Maintenance Due   Topic Date Due    Shingles Vaccine (1 of 2) Never done    Mammogram  04/01/2023       Problem List Items Addressed This Visit          ENT    Allergic rhinitis    Relevant Medications    cetirizine (ZYRTEC) 10 MG tablet    fexofenadine (ALLEGRA ALLERGY) 60 MG tablet    fluticasone propionate (FLONASE) 50 mcg/actuation nasal spray       Cardiac/Vascular    Hyperlipidemia    Relevant Medications    pravastatin (PRAVACHOL) 10 MG tablet    Hypertension - Primary    Relevant Medications    amLODIPine (NORVASC) 10 MG tablet    hydroCHLOROthiazide (HYDRODIURIL) 12.5 MG Tab    aspirin (ECOTRIN) 81 MG EC tablet       Endocrine    Vitamin D deficiency       Health Maintenance Topics with due status: Not Due       Topic Last Completion Date    Cervical Cancer Screening 11/19/2021    Diabetes Urine Screening 10/11/2022    Lipid Panel 10/11/2022    Hemoglobin A1c 10/11/2022    Colorectal Cancer Screening 11/09/2022       Future Appointments   Date Time Provider Department Center   8/10/2023  9:00 AM VANDANA Mathis [facility] [place]            Signature:  [facility] [address] [place] [address]  [phone]    Date of encounter: 3/9/23

## 2023-08-14 ENCOUNTER — OFFICE VISIT (OUTPATIENT)
Dept: FAMILY MEDICINE | Facility: CLINIC | Age: 53
End: 2023-08-14
Payer: COMMERCIAL

## 2023-08-14 ENCOUNTER — OFFICE VISIT (OUTPATIENT)
Dept: ORTHOPEDICS | Facility: CLINIC | Age: 53
End: 2023-08-14
Payer: COMMERCIAL

## 2023-08-14 VITALS
SYSTOLIC BLOOD PRESSURE: 117 MMHG | TEMPERATURE: 99 F | WEIGHT: 198 LBS | OXYGEN SATURATION: 100 % | DIASTOLIC BLOOD PRESSURE: 81 MMHG | RESPIRATION RATE: 16 BRPM | BODY MASS INDEX: 32.99 KG/M2 | HEIGHT: 65 IN | HEART RATE: 98 BPM

## 2023-08-14 DIAGNOSIS — E78.5 HYPERLIPIDEMIA, UNSPECIFIED HYPERLIPIDEMIA TYPE: ICD-10-CM

## 2023-08-14 DIAGNOSIS — M17.11 PRIMARY OSTEOARTHRITIS OF RIGHT KNEE: Primary | ICD-10-CM

## 2023-08-14 DIAGNOSIS — Z12.39 SCREENING BREAST EXAMINATION: ICD-10-CM

## 2023-08-14 DIAGNOSIS — J30.89 ALLERGIC RHINITIS DUE TO OTHER ALLERGIC TRIGGER, UNSPECIFIED SEASONALITY: ICD-10-CM

## 2023-08-14 DIAGNOSIS — I10 HYPERTENSION, UNSPECIFIED TYPE: Primary | ICD-10-CM

## 2023-08-14 PROBLEM — J30.9 ALLERGIC RHINITIS: Status: RESOLVED | Noted: 2021-07-29 | Resolved: 2023-08-14

## 2023-08-14 PROCEDURE — 20610 DRAIN/INJ JOINT/BURSA W/O US: CPT | Mod: S$PBB,RT,, | Performed by: ORTHOPAEDIC SURGERY

## 2023-08-14 PROCEDURE — 80061 LIPID PANEL: ICD-10-PCS | Mod: ,,, | Performed by: CLINICAL MEDICAL LABORATORY

## 2023-08-14 PROCEDURE — 99213 OFFICE O/P EST LOW 20 MIN: CPT | Mod: ,,, | Performed by: NURSE PRACTITIONER

## 2023-08-14 PROCEDURE — 3008F PR BODY MASS INDEX (BMI) DOCUMENTED: ICD-10-PCS | Mod: CPTII,,, | Performed by: NURSE PRACTITIONER

## 2023-08-14 PROCEDURE — 99213 OFFICE O/P EST LOW 20 MIN: CPT | Mod: PBBFAC,25 | Performed by: ORTHOPAEDIC SURGERY

## 2023-08-14 PROCEDURE — 3079F PR MOST RECENT DIASTOLIC BLOOD PRESSURE 80-89 MM HG: ICD-10-PCS | Mod: CPTII,,, | Performed by: NURSE PRACTITIONER

## 2023-08-14 PROCEDURE — 99214 OFFICE O/P EST MOD 30 MIN: CPT | Mod: S$PBB,25,, | Performed by: ORTHOPAEDIC SURGERY

## 2023-08-14 PROCEDURE — 1160F RVW MEDS BY RX/DR IN RCRD: CPT | Mod: CPTII,,, | Performed by: NURSE PRACTITIONER

## 2023-08-14 PROCEDURE — 1159F MED LIST DOCD IN RCRD: CPT | Mod: ,,, | Performed by: ORTHOPAEDIC SURGERY

## 2023-08-14 PROCEDURE — 99213 PR OFFICE/OUTPT VISIT, EST, LEVL III, 20-29 MIN: ICD-10-PCS | Mod: ,,, | Performed by: NURSE PRACTITIONER

## 2023-08-14 PROCEDURE — 1159F MED LIST DOCD IN RCRD: CPT | Mod: CPTII,,, | Performed by: NURSE PRACTITIONER

## 2023-08-14 PROCEDURE — 80053 COMPREHEN METABOLIC PANEL: CPT | Mod: ,,, | Performed by: CLINICAL MEDICAL LABORATORY

## 2023-08-14 PROCEDURE — 20610 PR DRAIN/INJECT LARGE JOINT/BURSA: ICD-10-PCS | Mod: S$PBB,RT,, | Performed by: ORTHOPAEDIC SURGERY

## 2023-08-14 PROCEDURE — 3074F SYST BP LT 130 MM HG: CPT | Mod: CPTII,,, | Performed by: NURSE PRACTITIONER

## 2023-08-14 PROCEDURE — 1160F PR REVIEW ALL MEDS BY PRESCRIBER/CLIN PHARMACIST DOCUMENTED: ICD-10-PCS | Mod: ,,, | Performed by: ORTHOPAEDIC SURGERY

## 2023-08-14 PROCEDURE — 1159F PR MEDICATION LIST DOCUMENTED IN MEDICAL RECORD: ICD-10-PCS | Mod: ,,, | Performed by: ORTHOPAEDIC SURGERY

## 2023-08-14 PROCEDURE — 1160F PR REVIEW ALL MEDS BY PRESCRIBER/CLIN PHARMACIST DOCUMENTED: ICD-10-PCS | Mod: CPTII,,, | Performed by: NURSE PRACTITIONER

## 2023-08-14 PROCEDURE — 3079F DIAST BP 80-89 MM HG: CPT | Mod: CPTII,,, | Performed by: NURSE PRACTITIONER

## 2023-08-14 PROCEDURE — 3074F PR MOST RECENT SYSTOLIC BLOOD PRESSURE < 130 MM HG: ICD-10-PCS | Mod: CPTII,,, | Performed by: NURSE PRACTITIONER

## 2023-08-14 PROCEDURE — 1160F RVW MEDS BY RX/DR IN RCRD: CPT | Mod: ,,, | Performed by: ORTHOPAEDIC SURGERY

## 2023-08-14 PROCEDURE — 99999PBSHW PR PBB SHADOW TECHNICAL ONLY FILED TO HB: ICD-10-PCS | Mod: PBBFAC,,,

## 2023-08-14 PROCEDURE — 99214 PR OFFICE/OUTPT VISIT, EST, LEVL IV, 30-39 MIN: ICD-10-PCS | Mod: S$PBB,25,, | Performed by: ORTHOPAEDIC SURGERY

## 2023-08-14 PROCEDURE — 3008F BODY MASS INDEX DOCD: CPT | Mod: CPTII,,, | Performed by: NURSE PRACTITIONER

## 2023-08-14 PROCEDURE — 20610 DRAIN/INJ JOINT/BURSA W/O US: CPT | Mod: PBBFAC | Performed by: ORTHOPAEDIC SURGERY

## 2023-08-14 PROCEDURE — 1159F PR MEDICATION LIST DOCUMENTED IN MEDICAL RECORD: ICD-10-PCS | Mod: CPTII,,, | Performed by: NURSE PRACTITIONER

## 2023-08-14 PROCEDURE — 80061 LIPID PANEL: CPT | Mod: ,,, | Performed by: CLINICAL MEDICAL LABORATORY

## 2023-08-14 PROCEDURE — 99999PBSHW PR PBB SHADOW TECHNICAL ONLY FILED TO HB: Mod: PBBFAC,,,

## 2023-08-14 PROCEDURE — 80053 COMPREHENSIVE METABOLIC PANEL: ICD-10-PCS | Mod: ,,, | Performed by: CLINICAL MEDICAL LABORATORY

## 2023-08-14 RX ORDER — HYDROCHLOROTHIAZIDE 12.5 MG/1
12.5 TABLET ORAL DAILY
Qty: 90 TABLET | Refills: 1 | Status: SHIPPED | OUTPATIENT
Start: 2023-08-14 | End: 2024-02-26

## 2023-08-14 RX ORDER — ERGOCALCIFEROL 1.25 MG/1
50000 CAPSULE ORAL
Qty: 15 CAPSULE | Refills: 1 | Status: SHIPPED | OUTPATIENT
Start: 2023-08-14 | End: 2024-02-23 | Stop reason: SDUPTHER

## 2023-08-14 RX ORDER — ASPIRIN 81 MG/1
81 TABLET ORAL DAILY
Qty: 90 TABLET | Refills: 1 | Status: SHIPPED | OUTPATIENT
Start: 2023-08-14 | End: 2024-02-23 | Stop reason: SDUPTHER

## 2023-08-14 RX ORDER — CETIRIZINE HYDROCHLORIDE 10 MG/1
10 TABLET ORAL DAILY
Qty: 90 TABLET | Refills: 1 | Status: SHIPPED | OUTPATIENT
Start: 2023-08-14 | End: 2024-02-26

## 2023-08-14 RX ORDER — TRIAMCINOLONE ACETONIDE 40 MG/ML
40 INJECTION, SUSPENSION INTRA-ARTICULAR; INTRAMUSCULAR
Status: COMPLETED | OUTPATIENT
Start: 2023-08-14 | End: 2023-08-14

## 2023-08-14 RX ORDER — MELOXICAM 15 MG/1
15 TABLET ORAL DAILY
Qty: 30 TABLET | Refills: 2 | Status: SHIPPED | OUTPATIENT
Start: 2023-08-14 | End: 2023-11-27

## 2023-08-14 RX ORDER — FEXOFENADINE HCL 60 MG
60 TABLET ORAL DAILY
Qty: 90 TABLET | Refills: 1 | Status: SHIPPED | OUTPATIENT
Start: 2023-08-14 | End: 2024-02-23 | Stop reason: SDUPTHER

## 2023-08-14 RX ORDER — BUPIVACAINE HYDROCHLORIDE 2.5 MG/ML
1 INJECTION, SOLUTION INFILTRATION; PERINEURAL
Status: COMPLETED | OUTPATIENT
Start: 2023-08-14 | End: 2023-08-14

## 2023-08-14 RX ORDER — PRAVASTATIN SODIUM 10 MG/1
10 TABLET ORAL DAILY
Qty: 90 TABLET | Refills: 1 | Status: SHIPPED | OUTPATIENT
Start: 2023-08-14 | End: 2024-02-23 | Stop reason: SDUPTHER

## 2023-08-14 RX ORDER — AMLODIPINE BESYLATE 10 MG/1
10 TABLET ORAL DAILY
Qty: 90 TABLET | Refills: 1 | Status: SHIPPED | OUTPATIENT
Start: 2023-08-14 | End: 2024-02-23 | Stop reason: SDUPTHER

## 2023-08-14 RX ADMIN — BUPIVACAINE HYDROCHLORIDE 2.5 MG: 2.5 INJECTION, SOLUTION INFILTRATION; PERINEURAL at 01:08

## 2023-08-14 RX ADMIN — TRIAMCINOLONE ACETONIDE 40 MG: 40 INJECTION, SUSPENSION INTRA-ARTICULAR; INTRAMUSCULAR at 01:08

## 2023-08-14 NOTE — PROGRESS NOTES
CLINIC NOTE       Chief Complaint   Patient presents with    Right Knee - Pain        Sandra Pérez is a 53 y.o. female seen today for recheck of her right knee.  She underwent right knee arthroscopy 12/01/2022.  She was found have degenerative tear of the posterior horn of the medial meniscus that was excised and grade 2 to 3/4 DJD involving the medial femoral condyle articular surface.  After initial improvement she was having some physician assistant pain with weight-bearing along the medial aspect of the knee.      Past Medical History:   Diagnosis Date    Hyperlipemia     Hypertension     PONV (postoperative nausea and vomiting)     Pulmonary edema      Family History   Problem Relation Age of Onset    Hypertension Mother     Diabetes Mother     No Known Problems Father     Hypertension Sister      Current Outpatient Medications on File Prior to Visit   Medication Sig Dispense Refill    amLODIPine (NORVASC) 10 MG tablet Take 1 tablet (10 mg total) by mouth once daily. 90 tablet 1    aspirin (ECOTRIN) 81 MG EC tablet Take 1 tablet (81 mg total) by mouth once daily. 90 tablet 1    cetirizine (ZYRTEC) 10 MG tablet Take 1 tablet (10 mg total) by mouth once daily. 90 tablet 1    cyclobenzaprine (FLEXERIL) 10 MG tablet Take 1 tablet (10 mg total) by mouth 3 (three) times daily as needed. 45 tablet 1    ergocalciferol (ERGOCALCIFEROL) 50,000 unit Cap Take 1 capsule (50,000 Units total) by mouth every 7 days. 15 capsule 1    fexofenadine (ALLEGRA ALLERGY) 60 MG tablet Take 1 tablet (60 mg total) by mouth once daily. 90 tablet 1    fluticasone propionate (FLONASE) 50 mcg/actuation nasal spray 2 sprays (100 mcg total) by Each Nostril route 2 (two) times daily. 16 g 1    hydroCHLOROthiazide (HYDRODIURIL) 12.5 MG Tab Take 1 tablet (12.5 mg total) by mouth once daily. 90 tablet 1    pravastatin (PRAVACHOL) 10 MG tablet Take 1 tablet (10 mg total) by mouth once daily. 90 tablet 1     Current Facility-Administered  Medications on File Prior to Visit   Medication Dose Route Frequency Provider Last Rate Last Admin    0.9%  NaCl infusion   Intravenous Continuous Domo Alegrai  mL/hr at 12/01/22 1401 Rate Change at 12/01/22 1401    cefazolin (ANCEF) 2 gram in dextrose 5% 50 mL IVPB (premix)  2 g Intravenous On Call Procedure Domo Alegria MD           ROS     There were no vitals filed for this visit.    Past Surgical History:   Procedure Laterality Date    ARTHROSCOPY OF KNEE Right 12/1/2022    Procedure: ARTHROSCOPY, KNEE WITH SHAVING OF MEDIAL FEMORAL CONDYLE;  Surgeon: Domo Alegria MD;  Location: [facility];  Service: Orthopedics;  Laterality: Right;    BILATERAL TUBAL LIGATION      BUNIONECTOMY      CHOLECYSTECTOMY      KNEE ARTHROSCOPY W/ MENISCECTOMY Right 12/1/2022    Procedure: ARTHROSCOPY, KNEE, WITH MEDIAL MENISCECTOMY;  Surgeon: Domo Alegria MD;  Location: [facility];  Service: Orthopedics;  Laterality: Right;    KNEE ARTHROSCOPY W/ PLICA EXCISION Right 12/1/2022    Procedure: EXCISION, PLICA, KNEE, ARTHROSCOPIC;  Surgeon: Domo Alegria MD;  Location: [facility];  Service: Orthopedics;  Laterality: Right;        Review of patient's allergies indicates:   Allergen Reactions    Shellfish containing products Edema    Lortab [hydrocodone-acetaminophen] Nausea And Vomiting        Ortho Exam :  Right knee shows 1+ intra-articular effusion.  Knee range motion is 0-125 degrees of flexion.  Knee ligaments stable clinically.  There is tenderness palpation along the medial joint line region.    Radiographic Examination:    Technique:    Findings:    Impression:   See Above    Assessment and Plan  Patient Active Problem List    Diagnosis Date Noted    Acute medial meniscus tear of right knee 11/16/2022    S/P arthroscopy of right knee 11/09/2022    History of colonic polyps 11/09/2022    Encounter for screening for colorectal malignant neoplasm 10/11/2022    Acute  pain of right knee 10/11/2022    Screening for viral disease 10/11/2022    Impaired fasting blood sugar 10/11/2022    Vitamin B deficiency 10/11/2022    Fatigue 10/11/2022    BMI 32.0-32.9,adult 10/11/2022    Intractable migraine without aura and without status migrainosus 09/20/2021    Other sleep apnea 09/20/2021    Hyperlipidemia 07/29/2021    Allergic rhinitis 07/29/2021    Hypertension 07/29/2021    Vitamin D deficiency 07/29/2021    Impression:  Early DJD medial compartment-right knee   Plan:  The right knee was prepped with Betadine intra-articular steroid injection performed with triamcinolone and Marcaine 1 cc each.  Rx Mobic 15 mg 1 p.o. q.d. p.r.n..  We discussed viscosupplementation injections as well as the possibility of total knee replacement arthroplasty hopefully many years in the future.      Domo Alegria M.D.

## 2023-08-15 PROBLEM — M25.561 ACUTE PAIN OF RIGHT KNEE: Status: RESOLVED | Noted: 2022-10-11 | Resolved: 2023-08-15

## 2023-08-15 PROBLEM — Z11.59 SCREENING FOR VIRAL DISEASE: Status: RESOLVED | Noted: 2022-10-11 | Resolved: 2023-08-15

## 2023-08-15 PROBLEM — Z12.12 ENCOUNTER FOR SCREENING FOR COLORECTAL MALIGNANT NEOPLASM: Status: RESOLVED | Noted: 2022-10-11 | Resolved: 2023-08-15

## 2023-08-15 PROBLEM — Z12.11 ENCOUNTER FOR SCREENING FOR COLORECTAL MALIGNANT NEOPLASM: Status: RESOLVED | Noted: 2022-10-11 | Resolved: 2023-08-15

## 2023-08-15 PROBLEM — S83.241A ACUTE MEDIAL MENISCUS TEAR OF RIGHT KNEE: Chronic | Status: RESOLVED | Noted: 2022-11-16 | Resolved: 2023-08-15

## 2023-08-15 LAB
ALBUMIN SERPL BCP-MCNC: 3.8 G/DL (ref 3.5–5)
ALBUMIN/GLOB SERPL: 0.8 {RATIO}
ALP SERPL-CCNC: 81 U/L (ref 41–108)
ALT SERPL W P-5'-P-CCNC: 30 U/L (ref 13–56)
ANION GAP SERPL CALCULATED.3IONS-SCNC: 8 MMOL/L (ref 7–16)
AST SERPL W P-5'-P-CCNC: 19 U/L (ref 15–37)
BILIRUB SERPL-MCNC: 0.3 MG/DL (ref ?–1.2)
BUN SERPL-MCNC: 11 MG/DL (ref 7–18)
BUN/CREAT SERPL: 9 (ref 6–20)
CALCIUM SERPL-MCNC: 9.4 MG/DL (ref 8.5–10.1)
CHLORIDE SERPL-SCNC: 107 MMOL/L (ref 98–107)
CHOLEST SERPL-MCNC: 158 MG/DL (ref 0–200)
CHOLEST/HDLC SERPL: 3.8 {RATIO}
CO2 SERPL-SCNC: 28 MMOL/L (ref 21–32)
CREAT SERPL-MCNC: 1.2 MG/DL (ref 0.55–1.02)
EGFR (NO RACE VARIABLE) (RUSH/TITUS): 55 ML/MIN/1.73M2
GLOBULIN SER-MCNC: 4.6 G/DL (ref 2–4)
GLUCOSE SERPL-MCNC: 183 MG/DL (ref 74–106)
HDLC SERPL-MCNC: 42 MG/DL (ref 40–60)
LDLC SERPL CALC-MCNC: 96 MG/DL
LDLC/HDLC SERPL: 2.3 {RATIO}
NONHDLC SERPL-MCNC: 116 MG/DL
POTASSIUM SERPL-SCNC: 4.4 MMOL/L (ref 3.5–5.1)
PROT SERPL-MCNC: 8.4 G/DL (ref 6.4–8.2)
SODIUM SERPL-SCNC: 139 MMOL/L (ref 136–145)
TRIGL SERPL-MCNC: 100 MG/DL (ref 35–150)
VLDLC SERPL-MCNC: 20 MG/DL

## 2023-08-15 NOTE — ASSESSMENT & PLAN NOTE
BP Readings from Last 3 Encounters:   08/14/23 117/81   03/09/23 134/89   12/01/22 109/69     The current medical regimen is effective;  continue present plan and medications.

## 2023-08-15 NOTE — PROGRESS NOTES
VANDANA Mathis   [facility] [address] [place] [address]  [phone]      PATIENT NAME: Sandra Pérez  : 1970  DATE: 23  MRN: [id number]      Billing Provider: VANDANA Mathis  Level of Service:   Patient PCP Information       Provider PCP Type    Sravani Montague NP General            Reason for Visit / Chief Complaint: Follow-up, Hyperlipidemia, and Hypertension       Update PCP  Update Chief Complaint         History of Present Illness / Problem Focused Workflow     Sandra Pérez presents to the clinic with Follow-up, Hyperlipidemia, and Hypertension     Pt presents for routine follow up with lab and med refills. Overall doing well.      BP appears  controlled today. Home meds reviewed  The current medical regimen is effective;  continue present plan and medications. Recommended patient to check home readings to monitor and see me for followup as scheduled or sooner as needed.   Discussed sodium restriction, maintaining ideal body weight and regular exercise program as physiologic means to continue to achieve blood pressure control in addition to medication compliance.  Patient was educated that both decongestant and anti-inflammatory medication may raise blood pressure.    Discussed need for low fat/low cholesterol diet, regular exercise, and weight control.   Cardiovascular risk and specific lipid/LDL goals reviewed.        Review of Systems     Review of Systems   Constitutional:  Negative for activity change and unexpected weight change.   HENT:  Negative for hearing loss, rhinorrhea and trouble swallowing.    Eyes:  Negative for discharge and visual disturbance.   Respiratory:  Negative for chest tightness and wheezing.    Cardiovascular:  Negative for chest pain and palpitations.   Gastrointestinal:  Negative for blood in stool, constipation, diarrhea and vomiting.    Endocrine: Positive for polydipsia and polyuria.   Genitourinary:  Negative for difficulty urinating, dysuria, hematuria and menstrual problem.   Musculoskeletal:  Positive for arthralgias, joint swelling and neck pain.   Neurological:  Negative for weakness and headaches.   Psychiatric/Behavioral:  Negative for confusion and dysphoric mood.         Medical / Social / Family History     Past Medical History:   Diagnosis Date    Hyperlipemia     Hypertension     PONV (postoperative nausea and vomiting)     Pulmonary edema        Past Surgical History:   Procedure Laterality Date    ARTHROSCOPY OF KNEE Right 12/1/2022    Procedure: ARTHROSCOPY, KNEE WITH SHAVING OF MEDIAL FEMORAL CONDYLE;  Surgeon: Domo Alegria MD;  Location: [facility];  Service: Orthopedics;  Laterality: Right;    BILATERAL TUBAL LIGATION      BUNIONECTOMY      CHOLECYSTECTOMY      KNEE ARTHROSCOPY W/ MENISCECTOMY Right 12/1/2022    Procedure: ARTHROSCOPY, KNEE, WITH MEDIAL MENISCECTOMY;  Surgeon: Domo Alegria MD;  Location: [facility];  Service: Orthopedics;  Laterality: Right;    KNEE ARTHROSCOPY W/ PLICA EXCISION Right 12/1/2022    Procedure: EXCISION, PLICA, KNEE, ARTHROSCOPIC;  Surgeon: Domo Alegria MD;  Location: [facility];  Service: Orthopedics;  Laterality: Right;       Social History  Ms. Pérez  reports that she has never smoked. She has never used smokeless tobacco. She reports current alcohol use. She reports that she does not use drugs.    Family History  Ms. Pérez's family history includes Diabetes in her mother; Hypertension in her mother and sister; No Known Problems in her father.    Medications and Allergies     Medications  Outpatient Medications Marked as Taking for the 8/14/23 encounter (Office Visit) with Jamaica Mays ACNP   Medication Sig Dispense Refill    cyclobenzaprine (FLEXERIL) 10 MG tablet Take 1 tablet (10 mg total) by mouth 3 (three) times daily as needed. 45  tablet 1    fluticasone propionate (FLONASE) 50 mcg/actuation nasal spray 2 sprays (100 mcg total) by Each Nostril route 2 (two) times daily. 16 g 1    meloxicam (MOBIC) 15 MG tablet Take 1 tablet (15 mg total) by mouth once daily. 30 tablet 2    [DISCONTINUED] amLODIPine (NORVASC) 10 MG tablet Take 1 tablet (10 mg total) by mouth once daily. 90 tablet 1    [DISCONTINUED] aspirin (ECOTRIN) 81 MG EC tablet Take 1 tablet (81 mg total) by mouth once daily. 90 tablet 1    [DISCONTINUED] cetirizine (ZYRTEC) 10 MG tablet Take 1 tablet (10 mg total) by mouth once daily. 90 tablet 1    [DISCONTINUED] ergocalciferol (ERGOCALCIFEROL) 50,000 unit Cap Take 1 capsule (50,000 Units total) by mouth every 7 days. 15 capsule 1    [DISCONTINUED] fexofenadine (ALLEGRA ALLERGY) 60 MG tablet Take 1 tablet (60 mg total) by mouth once daily. 90 tablet 1    [DISCONTINUED] hydroCHLOROthiazide (HYDRODIURIL) 12.5 MG Tab Take 1 tablet (12.5 mg total) by mouth once daily. 90 tablet 1    [DISCONTINUED] pravastatin (PRAVACHOL) 10 MG tablet Take 1 tablet (10 mg total) by mouth once daily. 90 tablet 1     Current Facility-Administered Medications for the 8/14/23 encounter (Office Visit) with Jamaica Mays ACNP   Medication Dose Route Frequency Provider Last Rate Last Admin    [COMPLETED] BUPivacaine 0.25% (2.5 mg/ml) injection 2.5 mg  1 mL Intra-articular 1 time in Clinic/HOD Domo Alegria MD   2.5 mg at 08/14/23 1316    [COMPLETED] triamcinolone acetonide injection 40 mg  40 mg Intra-articular 1 time in Clinic/Domo Carbajal MD   40 mg at 08/14/23 1316       Allergies  Review of patient's allergies indicates:   Allergen Reactions    Shellfish containing products Edema    Lortab [hydrocodone-acetaminophen] Nausea And Vomiting       Physical Examination     Vitals:    08/14/23 1523   BP: 117/81   Pulse: 98   Resp: 16   Temp: 98.5 °F (36.9 °C)     Physical Exam  Eyes:      Pupils: Pupils are equal, round, and reactive to light.    Cardiovascular:      Rate and Rhythm: Normal rate and regular rhythm.      Heart sounds: Normal heart sounds. No murmur heard.  Pulmonary:      Breath sounds: Normal breath sounds. No wheezing, rhonchi or rales.   Abdominal:      General: Bowel sounds are normal.   Musculoskeletal:         General: No swelling.      Cervical back: Normal range of motion and neck supple.   Skin:     General: Skin is warm and dry.   Neurological:      Mental Status: She is alert and oriented to person, place, and time.          Lab Results   Component Value Date    WBC 7.50 10/11/2022    HGB 12.9 10/11/2022    HCT 41.0 10/11/2022    MCV 88.0 10/11/2022     10/11/2022        Sodium   Date Value Ref Range Status   10/11/2022 140 136 - 145 mmol/L Final     Potassium   Date Value Ref Range Status   10/11/2022 4.0 3.5 - 5.1 mmol/L Final     Chloride   Date Value Ref Range Status   10/11/2022 104 98 - 107 mmol/L Final     CO2   Date Value Ref Range Status   10/11/2022 28 21 - 32 mmol/L Final     Glucose   Date Value Ref Range Status   10/11/2022 105 74 - 106 mg/dL Final     BUN   Date Value Ref Range Status   10/11/2022 13 7 - 18 mg/dL Final     Creatinine   Date Value Ref Range Status   10/11/2022 0.83 0.55 - 1.02 mg/dL Final     Calcium   Date Value Ref Range Status   10/11/2022 9.4 8.5 - 10.1 mg/dL Final     Total Protein   Date Value Ref Range Status   10/11/2022 7.5 6.4 - 8.2 g/dL Final     Albumin   Date Value Ref Range Status   10/11/2022 3.6 3.5 - 5.0 g/dL Final     Bilirubin, Total   Date Value Ref Range Status   10/11/2022 0.4 >0.0 - 1.2 mg/dL Final     Alk Phos   Date Value Ref Range Status   10/11/2022 72 41 - 108 U/L Final     AST   Date Value Ref Range Status   10/11/2022 16 15 - 37 U/L Final     ALT   Date Value Ref Range Status   10/11/2022 33 13 - 56 U/L Final     Anion Gap   Date Value Ref Range Status   10/11/2022 12 7 - 16 mmol/L Final     eGFR   Date Value Ref Range Status   10/11/2022 85 >=60 mL/min/1.73m²  "Final      Lab Results   Component Value Date    HGBA1C 6.0 10/11/2022      Lab Results   Component Value Date    CHOL 212 (H) 10/11/2022    CHOL 162 10/06/2021    CHOL 174 07/29/2021     Lab Results   Component Value Date    HDL 46 10/11/2022    HDL 48 10/06/2021    HDL 43 07/29/2021     Lab Results   Component Value Date    LDLCALC 148 10/11/2022    LDLCALC 93 10/06/2021    LDLCALC 108 07/29/2021     No results found for: "DLDL"  Lab Results   Component Value Date    TRIG 89 10/11/2022    TRIG 105 10/06/2021    TRIG 115 07/29/2021     Lab Results   Component Value Date    CHOLHDL 4.6 10/11/2022    CHOLHDL 3.4 10/06/2021    CHOLHDL 4.0 07/29/2021      Lab Results   Component Value Date    TSH 2.360 10/11/2022    FREET4 0.92 10/11/2022        Assessment and Plan (including Health Maintenance)      Problem List  Smart Sets  Document Outside HM   :    Plan:     1. Hypertension, unspecified type  Assessment & Plan:  BP Readings from Last 3 Encounters:   08/14/23 117/81   03/09/23 134/89   12/01/22 109/69     The current medical regimen is effective;  continue present plan and medications.      Orders:  -     Comprehensive Metabolic Panel; Future; Expected date: 08/14/2023  -     amLODIPine (NORVASC) 10 MG tablet; Take 1 tablet (10 mg total) by mouth once daily.  Dispense: 90 tablet; Refill: 1  -     aspirin (ECOTRIN) 81 MG EC tablet; Take 1 tablet (81 mg total) by mouth once daily.  Dispense: 90 tablet; Refill: 1  -     hydroCHLOROthiazide (HYDRODIURIL) 12.5 MG Tab; Take 1 tablet (12.5 mg total) by mouth once daily.  Dispense: 90 tablet; Refill: 1    2. Hyperlipidemia, unspecified hyperlipidemia type  Assessment & Plan:  Lab Results   Component Value Date    CHOL 212 (H) 10/11/2022    CHOL 162 10/06/2021    CHOL 174 07/29/2021     Lab Results   Component Value Date    HDL 46 10/11/2022    HDL 48 10/06/2021    HDL 43 07/29/2021     Lab Results   Component Value Date    LDLCALC 148 10/11/2022    LDLCALC 93 10/06/2021    " "LDLCALC 108 07/29/2021     No results found for: "DLDL"  Lab Results   Component Value Date    TRIG 89 10/11/2022    TRIG 105 10/06/2021    TRIG 115 07/29/2021       f1   Lab Results   Component Value Date    CHOLHDL 4.6 10/11/2022    CHOLHDL 3.4 10/06/2021    CHOLHDL 4.0 07/29/2021     The current medical regimen is effective;  continue present plan and medications.      Orders:  -     Lipid Panel; Future; Expected date: 08/14/2023  -     pravastatin (PRAVACHOL) 10 MG tablet; Take 1 tablet (10 mg total) by mouth once daily.  Dispense: 90 tablet; Refill: 1    3. Allergic rhinitis due to other allergic trigger, unspecified seasonality  -     cetirizine (ZYRTEC) 10 MG tablet; Take 1 tablet (10 mg total) by mouth once daily.  Dispense: 90 tablet; Refill: 1  -     fexofenadine (ALLEGRA ALLERGY) 60 MG tablet; Take 1 tablet (60 mg total) by mouth once daily.  Dispense: 90 tablet; Refill: 1    4. Screening breast examination  -     Mammo Digital Screening Bilat; Future; Expected date: 08/14/2023    Other orders  -     ergocalciferol (ERGOCALCIFEROL) 50,000 unit Cap; Take 1 capsule (50,000 Units total) by mouth every 7 days.  Dispense: 15 capsule; Refill: 1         There are no Patient Instructions on file for this visit.     Health Maintenance Due   Topic Date Due    Mammogram  04/01/2023         Health Maintenance Topics with due status: Not Due       Topic Last Completion Date    Cervical Cancer Screening 11/19/2021    Diabetes Urine Screening 10/11/2022    Lipid Panel 10/11/2022    Hemoglobin A1c 10/11/2022    Colorectal Cancer Screening 11/09/2022       Future Appointments   Date Time Provider Department Center   12/18/2023  9:10 AM Domo Alegria MD [facility]   2/14/2024  8:20 AM [facility]            Signature:  VANDANA Mathis [facility] [address] [place] " [id number]  [phone]    Date of encounter: 8/14/23

## 2023-08-15 NOTE — ASSESSMENT & PLAN NOTE
"Lab Results   Component Value Date    CHOL 212 (H) 10/11/2022    CHOL 162 10/06/2021    CHOL 174 07/29/2021     Lab Results   Component Value Date    HDL 46 10/11/2022    HDL 48 10/06/2021    HDL 43 07/29/2021     Lab Results   Component Value Date    LDLCALC 148 10/11/2022    LDLCALC 93 10/06/2021    LDLCALC 108 07/29/2021     No results found for: "DLDL"  Lab Results   Component Value Date    TRIG 89 10/11/2022    TRIG 105 10/06/2021    TRIG 115 07/29/2021       f1   Lab Results   Component Value Date    CHOLHDL 4.6 10/11/2022    CHOLHDL 3.4 10/06/2021    CHOLHDL 4.0 07/29/2021     The current medical regimen is effective;  continue present plan and medications.    "

## 2023-09-11 ENCOUNTER — PATIENT MESSAGE (OUTPATIENT)
Dept: ADMINISTRATIVE | Facility: HOSPITAL | Age: 53
End: 2023-09-11

## 2023-10-16 ENCOUNTER — PATIENT OUTREACH (OUTPATIENT)
Dept: ADMINISTRATIVE | Facility: HOSPITAL | Age: 53
End: 2023-10-16

## 2023-10-16 NOTE — Clinical Note
Pt responded to the campaign portal and would like to be scheduled at Question: Tell us the test and location you'd like to be scheduled. Answer:   Radiology clinic in [place] for her mammogram.

## 2023-10-16 NOTE — PROGRESS NOTES
Pt responded to the campaign portal and would like to be scheduled at Question: Tell us the test and location you'd like to be scheduled.  Answer:   Radiology clinic in [place] for her mammogram.

## 2023-11-22 DIAGNOSIS — M17.11 PRIMARY OSTEOARTHRITIS OF RIGHT KNEE: Primary | ICD-10-CM

## 2023-11-27 RX ORDER — MELOXICAM 15 MG/1
15 TABLET ORAL
Qty: 30 TABLET | Refills: 0 | Status: SHIPPED | OUTPATIENT
Start: 2023-11-27 | End: 2023-12-26 | Stop reason: SDUPTHER

## 2023-12-26 ENCOUNTER — OFFICE VISIT (OUTPATIENT)
Dept: ORTHOPEDICS | Facility: CLINIC | Age: 53
End: 2023-12-26
Payer: COMMERCIAL

## 2023-12-26 DIAGNOSIS — M17.11 PRIMARY OSTEOARTHRITIS OF RIGHT KNEE: Primary | ICD-10-CM

## 2023-12-26 PROCEDURE — 99999PBSHW PR PBB SHADOW TECHNICAL ONLY FILED TO HB: ICD-10-PCS | Mod: PBBFAC,,,

## 2023-12-26 PROCEDURE — 20610 DRAIN/INJ JOINT/BURSA W/O US: CPT | Mod: PBBFAC | Performed by: NURSE PRACTITIONER

## 2023-12-26 PROCEDURE — 99214 OFFICE O/P EST MOD 30 MIN: CPT | Mod: S$PBB,25,, | Performed by: NURSE PRACTITIONER

## 2023-12-26 PROCEDURE — 1160F PR REVIEW ALL MEDS BY PRESCRIBER/CLIN PHARMACIST DOCUMENTED: ICD-10-PCS | Mod: ,,, | Performed by: NURSE PRACTITIONER

## 2023-12-26 PROCEDURE — 99212 OFFICE O/P EST SF 10 MIN: CPT | Mod: PBBFAC | Performed by: NURSE PRACTITIONER

## 2023-12-26 PROCEDURE — 1159F MED LIST DOCD IN RCRD: CPT | Mod: ,,, | Performed by: NURSE PRACTITIONER

## 2023-12-26 PROCEDURE — 99999PBSHW PR PBB SHADOW TECHNICAL ONLY FILED TO HB: Mod: PBBFAC,,,

## 2023-12-26 PROCEDURE — 20610 LARGE JOINT ASPIRATION/INJECTION: R KNEE: ICD-10-PCS | Mod: S$PBB,RT,, | Performed by: NURSE PRACTITIONER

## 2023-12-26 PROCEDURE — 1160F RVW MEDS BY RX/DR IN RCRD: CPT | Mod: ,,, | Performed by: NURSE PRACTITIONER

## 2023-12-26 PROCEDURE — 1159F PR MEDICATION LIST DOCUMENTED IN MEDICAL RECORD: ICD-10-PCS | Mod: ,,, | Performed by: NURSE PRACTITIONER

## 2023-12-26 PROCEDURE — 99214 PR OFFICE/OUTPT VISIT, EST, LEVL IV, 30-39 MIN: ICD-10-PCS | Mod: S$PBB,25,, | Performed by: NURSE PRACTITIONER

## 2023-12-26 RX ORDER — MELOXICAM 15 MG/1
15 TABLET ORAL DAILY PRN
Qty: 30 TABLET | Refills: 0 | Status: SHIPPED | OUTPATIENT
Start: 2023-12-26 | End: 2024-02-09 | Stop reason: SDUPTHER

## 2023-12-26 RX ORDER — TRIAMCINOLONE ACETONIDE 40 MG/ML
40 INJECTION, SUSPENSION INTRA-ARTICULAR; INTRAMUSCULAR
Status: DISCONTINUED | OUTPATIENT
Start: 2023-12-26 | End: 2023-12-26 | Stop reason: HOSPADM

## 2023-12-26 RX ORDER — BUPIVACAINE HYDROCHLORIDE 2.5 MG/ML
1 INJECTION, SOLUTION EPIDURAL; INFILTRATION; INTRACAUDAL
Status: DISCONTINUED | OUTPATIENT
Start: 2023-12-26 | End: 2023-12-26 | Stop reason: HOSPADM

## 2023-12-26 RX ADMIN — BUPIVACAINE HYDROCHLORIDE 1 ML: 2.5 INJECTION, SOLUTION EPIDURAL; INFILTRATION; INTRACAUDAL at 03:12

## 2023-12-26 RX ADMIN — TRIAMCINOLONE ACETONIDE 40 MG: 40 INJECTION, SUSPENSION INTRA-ARTICULAR; INTRAMUSCULAR at 03:12

## 2023-12-26 NOTE — PROGRESS NOTES
53-year-old female presents ambulatory orthopedic clinic requesting repeat intra-articular steroid injection right knee.  She was known to have primary localized DJD.  She states last shot she received August 14, 2023 worked up until the last day or so.  Denies injury trauma.  She denies fever, chills or any signs symptom flexion.  She does state after she sits for protracted period time she does have some stiffness in her right knee.    PE:  Physical exam right knee skin is warm, dry and intact.  No soft tissue swelling noted.  No intra-articular effusion appreciated clinically.  Range motion right knee 0° extension with further flexion approximately 100°.    Impression: Primary localized DJD knee-right     Plan:  Safety guidelines and activity restrictions are discussed with the patient.  She verbalized understanding.  Right knee prepped with Betadine.  Intra-articular triamcinolone 40 mg and 0.25% bupivacaine 1 cc instilled without difficulty.  Return orthopedic clinic on a as needed basis.

## 2023-12-26 NOTE — PATIENT INSTRUCTIONS
Safety guidelines and activity restrictions are discussed with the patient.  She verbalized understanding.  Right knee prepped with Betadine.  Intra-articular triamcinolone 40 mg and 0.25% bupivacaine 1 cc instilled without difficulty.  Return orthopedic clinic on a as needed basis.

## 2023-12-26 NOTE — PROCEDURES
Large Joint Aspiration/Injection: R knee    Date/Time: 12/26/2023 3:00 PM    Performed by: Dain Garcia FNP  Authorized by: Dain Garcia FNP    Consent Done?:  Yes (Verbal)  Indications:  Arthritis and pain  Site marked: the procedure site was marked    Timeout: prior to procedure the correct patient, procedure, and site was verified    Local anesthesia used?: No      Details:  Needle Size:  25 G  Ultrasonic Guidance for needle placement?: No    Approach:  Anterolateral  Location:  Knee  Site:  R knee  Medications:  1 mL BUPivacaine (PF) 0.25% (2.5 mg/ml) 0.25 % (2.5 mg/mL); 40 mg triamcinolone acetonide 40 mg/mL  Patient tolerance:  Patient tolerated the procedure well with no immediate complications     Right knee prepped with Betadine

## 2024-01-10 LAB — BCS RECOMMENDATION EXT: NORMAL

## 2024-02-09 DIAGNOSIS — M17.11 PRIMARY OSTEOARTHRITIS OF RIGHT KNEE: Primary | ICD-10-CM

## 2024-02-09 RX ORDER — MELOXICAM 15 MG/1
15 TABLET ORAL DAILY PRN
Qty: 90 TABLET | Refills: 2 | Status: SHIPPED | OUTPATIENT
Start: 2024-02-09

## 2024-02-09 NOTE — PROGRESS NOTES
Spoke with primary care provider by private text.  She was in agreement with Mobic 15 mg 1 p.o. daily p.r.n. arthritic pain number 90 with 2 refills.

## 2024-02-23 RX ORDER — CETIRIZINE HYDROCHLORIDE 10 MG/1
10 TABLET ORAL DAILY
Qty: 90 TABLET | Refills: 1 | Status: CANCELLED | OUTPATIENT
Start: 2024-02-23

## 2024-02-23 RX ORDER — HYDROCHLOROTHIAZIDE 12.5 MG/1
12.5 TABLET ORAL DAILY
Qty: 90 TABLET | Refills: 1 | Status: CANCELLED | OUTPATIENT
Start: 2024-02-23

## 2024-02-26 ENCOUNTER — OFFICE VISIT (OUTPATIENT)
Dept: FAMILY MEDICINE | Facility: CLINIC | Age: 54
End: 2024-02-26
Payer: COMMERCIAL

## 2024-02-26 VITALS
BODY MASS INDEX: 32.32 KG/M2 | HEART RATE: 81 BPM | HEIGHT: 65 IN | RESPIRATION RATE: 16 BRPM | TEMPERATURE: 98 F | OXYGEN SATURATION: 98 % | WEIGHT: 194 LBS | SYSTOLIC BLOOD PRESSURE: 119 MMHG | DIASTOLIC BLOOD PRESSURE: 84 MMHG

## 2024-02-26 DIAGNOSIS — J30.89 ALLERGIC RHINITIS DUE TO OTHER ALLERGIC TRIGGER, UNSPECIFIED SEASONALITY: ICD-10-CM

## 2024-02-26 DIAGNOSIS — E78.5 HYPERLIPIDEMIA, UNSPECIFIED HYPERLIPIDEMIA TYPE: ICD-10-CM

## 2024-02-26 DIAGNOSIS — Z13.1 SCREENING FOR DIABETES MELLITUS: ICD-10-CM

## 2024-02-26 DIAGNOSIS — I10 HYPERTENSION, UNSPECIFIED TYPE: Primary | ICD-10-CM

## 2024-02-26 LAB
ALBUMIN SERPL BCP-MCNC: 3.5 G/DL (ref 3.5–5)
ALBUMIN/GLOB SERPL: 0.8 {RATIO}
ALP SERPL-CCNC: 92 U/L (ref 41–108)
ALT SERPL W P-5'-P-CCNC: 38 U/L (ref 13–56)
ANION GAP SERPL CALCULATED.3IONS-SCNC: 6 MMOL/L (ref 7–16)
AST SERPL W P-5'-P-CCNC: 30 U/L (ref 15–37)
BASOPHILS # BLD AUTO: 0.05 K/UL (ref 0–0.2)
BASOPHILS NFR BLD AUTO: 0.6 % (ref 0–1)
BILIRUB SERPL-MCNC: 0.3 MG/DL (ref ?–1.2)
BUN SERPL-MCNC: 15 MG/DL (ref 7–18)
BUN/CREAT SERPL: 15 (ref 6–20)
CALCIUM SERPL-MCNC: 9.6 MG/DL (ref 8.5–10.1)
CHLORIDE SERPL-SCNC: 105 MMOL/L (ref 98–107)
CHOLEST SERPL-MCNC: 152 MG/DL (ref 0–200)
CHOLEST/HDLC SERPL: 2.6 {RATIO}
CO2 SERPL-SCNC: 32 MMOL/L (ref 21–32)
CREAT SERPL-MCNC: 0.98 MG/DL (ref 0.55–1.02)
DIFFERENTIAL METHOD BLD: ABNORMAL
EGFR (NO RACE VARIABLE) (RUSH/TITUS): 70 ML/MIN/1.73M2
EOSINOPHIL # BLD AUTO: 0.25 K/UL (ref 0–0.5)
EOSINOPHIL NFR BLD AUTO: 3.2 % (ref 1–4)
ERYTHROCYTE [DISTWIDTH] IN BLOOD BY AUTOMATED COUNT: 14.1 % (ref 11.5–14.5)
GLOBULIN SER-MCNC: 4.3 G/DL (ref 2–4)
GLUCOSE SERPL-MCNC: 110 MG/DL (ref 74–106)
HCT VFR BLD AUTO: 36.4 % (ref 38–47)
HDLC SERPL-MCNC: 59 MG/DL (ref 40–60)
HGB BLD-MCNC: 12.1 G/DL (ref 12–16)
IMM GRANULOCYTES # BLD AUTO: 0.01 K/UL (ref 0–0.04)
IMM GRANULOCYTES NFR BLD: 0.1 % (ref 0–0.4)
LDLC SERPL CALC-MCNC: 81 MG/DL
LDLC/HDLC SERPL: 1.4 {RATIO}
LYMPHOCYTES # BLD AUTO: 2.86 K/UL (ref 1–4.8)
LYMPHOCYTES NFR BLD AUTO: 36.2 % (ref 27–41)
MCH RBC QN AUTO: 28 PG (ref 27–31)
MCHC RBC AUTO-ENTMCNC: 33.2 G/DL (ref 32–36)
MCV RBC AUTO: 84.3 FL (ref 80–96)
MONOCYTES # BLD AUTO: 0.77 K/UL (ref 0–0.8)
MONOCYTES NFR BLD AUTO: 9.7 % (ref 2–6)
MPC BLD CALC-MCNC: 10.4 FL (ref 9.4–12.4)
NEUTROPHILS # BLD AUTO: 3.97 K/UL (ref 1.8–7.7)
NEUTROPHILS NFR BLD AUTO: 50.2 % (ref 53–65)
NONHDLC SERPL-MCNC: 93 MG/DL
NRBC # BLD AUTO: 0 X10E3/UL
NRBC, AUTO (.00): 0 %
PLATELET # BLD AUTO: 329 K/UL (ref 150–400)
POTASSIUM SERPL-SCNC: 2.8 MMOL/L (ref 3.5–5.1)
PROT SERPL-MCNC: 7.8 G/DL (ref 6.4–8.2)
RBC # BLD AUTO: 4.32 M/UL (ref 4.2–5.4)
SODIUM SERPL-SCNC: 140 MMOL/L (ref 136–145)
TRIGL SERPL-MCNC: 62 MG/DL (ref 35–150)
VLDLC SERPL-MCNC: 12 MG/DL
WBC # BLD AUTO: 7.91 K/UL (ref 4.5–11)

## 2024-02-26 PROCEDURE — 1160F RVW MEDS BY RX/DR IN RCRD: CPT | Mod: CPTII,,, | Performed by: NURSE PRACTITIONER

## 2024-02-26 PROCEDURE — 80061 LIPID PANEL: CPT | Mod: ,,, | Performed by: CLINICAL MEDICAL LABORATORY

## 2024-02-26 PROCEDURE — 3061F NEG MICROALBUMINURIA REV: CPT | Mod: CPTII,,, | Performed by: NURSE PRACTITIONER

## 2024-02-26 PROCEDURE — 3044F HG A1C LEVEL LT 7.0%: CPT | Mod: CPTII,,, | Performed by: NURSE PRACTITIONER

## 2024-02-26 PROCEDURE — 83036 HEMOGLOBIN GLYCOSYLATED A1C: CPT | Mod: ,,, | Performed by: CLINICAL MEDICAL LABORATORY

## 2024-02-26 PROCEDURE — 80053 COMPREHEN METABOLIC PANEL: CPT | Mod: ,,, | Performed by: CLINICAL MEDICAL LABORATORY

## 2024-02-26 PROCEDURE — 99213 OFFICE O/P EST LOW 20 MIN: CPT | Mod: ,,, | Performed by: NURSE PRACTITIONER

## 2024-02-26 PROCEDURE — 82570 ASSAY OF URINE CREATININE: CPT | Mod: ,,, | Performed by: CLINICAL MEDICAL LABORATORY

## 2024-02-26 PROCEDURE — 3008F BODY MASS INDEX DOCD: CPT | Mod: CPTII,,, | Performed by: NURSE PRACTITIONER

## 2024-02-26 PROCEDURE — 1159F MED LIST DOCD IN RCRD: CPT | Mod: CPTII,,, | Performed by: NURSE PRACTITIONER

## 2024-02-26 PROCEDURE — 3066F NEPHROPATHY DOC TX: CPT | Mod: CPTII,,, | Performed by: NURSE PRACTITIONER

## 2024-02-26 PROCEDURE — 82043 UR ALBUMIN QUANTITATIVE: CPT | Mod: ,,, | Performed by: CLINICAL MEDICAL LABORATORY

## 2024-02-26 PROCEDURE — 3079F DIAST BP 80-89 MM HG: CPT | Mod: CPTII,,, | Performed by: NURSE PRACTITIONER

## 2024-02-26 PROCEDURE — 85025 COMPLETE CBC W/AUTO DIFF WBC: CPT | Mod: ,,, | Performed by: CLINICAL MEDICAL LABORATORY

## 2024-02-26 PROCEDURE — 3074F SYST BP LT 130 MM HG: CPT | Mod: CPTII,,, | Performed by: NURSE PRACTITIONER

## 2024-02-26 RX ORDER — ERGOCALCIFEROL 1.25 MG/1
50000 CAPSULE ORAL
Qty: 15 CAPSULE | Refills: 1 | Status: SHIPPED | OUTPATIENT
Start: 2024-02-26

## 2024-02-26 RX ORDER — ASPIRIN 81 MG/1
81 TABLET ORAL DAILY
Qty: 90 TABLET | Refills: 1 | Status: SHIPPED | OUTPATIENT
Start: 2024-02-26

## 2024-02-26 RX ORDER — PRAVASTATIN SODIUM 10 MG/1
10 TABLET ORAL DAILY
Qty: 90 TABLET | Refills: 1 | Status: SHIPPED | OUTPATIENT
Start: 2024-02-26 | End: 2024-05-30

## 2024-02-26 RX ORDER — ESCITALOPRAM OXALATE 10 MG/1
10 TABLET ORAL DAILY
Qty: 90 TABLET | Refills: 1 | Status: SHIPPED | OUTPATIENT
Start: 2024-02-26

## 2024-02-26 RX ORDER — FEXOFENADINE HCL 60 MG
60 TABLET ORAL DAILY
Qty: 90 TABLET | Refills: 1 | Status: SHIPPED | OUTPATIENT
Start: 2024-02-26

## 2024-02-26 RX ORDER — AMLODIPINE BESYLATE 10 MG/1
10 TABLET ORAL DAILY
Qty: 90 TABLET | Refills: 1 | Status: SHIPPED | OUTPATIENT
Start: 2024-02-26

## 2024-02-27 LAB
CREAT UR-MCNC: 280 MG/DL (ref 28–219)
EST. AVERAGE GLUCOSE BLD GHB EST-MCNC: 120 MG/DL
HBA1C MFR BLD HPLC: 5.8 % (ref 4.5–6.6)
MICROALBUMIN UR-MCNC: 1.3 MG/DL (ref 0–2.8)
MICROALBUMIN/CREAT RATIO PNL UR: 4.6 MG/G (ref 0–30)

## 2024-02-27 NOTE — ASSESSMENT & PLAN NOTE
Lab Results   Component Value Date    CHOL 152 02/26/2024    CHOL 158 08/14/2023    CHOL 212 (H) 10/11/2022     Lab Results   Component Value Date    HDL 59 02/26/2024    HDL 42 08/14/2023    HDL 46 10/11/2022     Lab Results   Component Value Date    LDLCALC 81 02/26/2024    LDLCALC 96 08/14/2023    LDLCALC 148 10/11/2022     Lab Results   Component Value Date    TRIG 62 02/26/2024    TRIG 100 08/14/2023    TRIG 89 10/11/2022       Lab Results   Component Value Date    CHOLHDL 2.6 02/26/2024    CHOLHDL 3.8 08/14/2023    CHOLHDL 4.6 10/11/2022    The current medical regimen is effective;  continue present plan and medications.

## 2024-02-27 NOTE — PROGRESS NOTES
VANDANA Mathis   [facility] [address] [place] [address]  [phone]      PATIENT NAME: Sandra Pérez  : 1970  DATE: 24  MRN: [id number]      Billing Provider: VANDANA Mathis  Level of Service:   Patient PCP Information       Provider PCP Type    VANDANA Mathis General            Reason for Visit / Chief Complaint: Follow-up, Hypertension, and Hyperlipidemia       Update PCP  Update Chief Complaint         History of Present Illness / Problem Focused Workflow     Sandra Pérez presents to the clinic with Follow-up, Hypertension, and Hyperlipidemia     Pt presents for routine follow up with lab and med refills. Overall doing well.      BP appears  controlled today. Home meds reviewed  The current medical regimen is effective;  continue present plan and medications. Recommended patient to check home readings to monitor and see me for followup as scheduled or sooner as needed.   Discussed sodium restriction, maintaining ideal body weight and regular exercise program as physiologic means to continue to achieve blood pressure control in addition to medication compliance.  Patient was educated that both decongestant and anti-inflammatory medication may raise blood pressure.  Advised to monitor BP at home. Advised on optimal BP readings - SBP < 130 & DBP < 80. Advised to call office for any persistent BP elevation and may have to prescribe or adjust BP med(s).  Recommended DASH diet, stay well hydrated with water daily, eliminate or decrease caffeinated and high calorie drinks, increase physical activity, and lose weight if BMI > 25.0.    Discussed need for low fat/low cholesterol diet, regular exercise, and weight control.   Cardiovascular risk and specific lipid/LDL goals reviewed.    Health Maintenance:  Colonoscopy:  recommend follow up 3-5 yrs for colonoscopy/3yrs for  cologuard  Flu:  recommend annual vaccine  Pna:  recommend for over 65+ yrs and those with high risk including DM  MMG:  recommend annual evaluation   DXA:  recommend every 2-3 yr follow up  Shingles: recommend for anyone 50+yrs  Tetanus: recommended every 10 years unless there is an injruy  Eye exam:  recommend annual evaluation         Review of Systems     Review of Systems   Constitutional:  Negative for fatigue and fever.   HENT:  Negative for nasal congestion and sore throat.    Eyes:  Negative for visual disturbance.   Respiratory:  Negative for chest tightness and shortness of breath.    Cardiovascular:  Negative for chest pain and leg swelling.   Gastrointestinal:  Negative for abdominal pain and change in bowel habit.   Endocrine: Negative for polydipsia, polyphagia and polyuria.   Genitourinary:  Negative for dysuria and hematuria.   Musculoskeletal:  Negative for back pain and leg pain.   Integumentary:  Negative for rash.   Neurological:  Negative for dizziness, syncope, weakness and light-headedness.        Medical / Social / Family History     Past Medical History:   Diagnosis Date    Hyperlipemia     Hypertension     PONV (postoperative nausea and vomiting)     Pulmonary edema        Past Surgical History:   Procedure Laterality Date    ARTHROSCOPY OF KNEE Right 12/1/2022    Procedure: ARTHROSCOPY, KNEE WITH SHAVING OF MEDIAL FEMORAL CONDYLE;  Surgeon: Domo Alegria MD;  Location: [facility];  Service: Orthopedics;  Laterality: Right;    BILATERAL TUBAL LIGATION      BUNIONECTOMY      CHOLECYSTECTOMY      KNEE ARTHROSCOPY W/ MENISCECTOMY Right 12/1/2022    Procedure: ARTHROSCOPY, KNEE, WITH MEDIAL MENISCECTOMY;  Surgeon: Domo Alegria MD;  Location: [facility] OR;  Service: Orthopedics;  Laterality: Right;    KNEE ARTHROSCOPY W/ PLICA EXCISION Right 12/1/2022    Procedure: EXCISION, PLICA, KNEE, ARTHROSCOPIC;  Surgeon: Domo Alegria MD;  Location: [facility] OR;   Service: Orthopedics;  Laterality: Right;       Social History  Ms. Pérez  reports that she has never smoked. She has never used smokeless tobacco. She reports current alcohol use. She reports that she does not use drugs.    Family History  Ms. Pérez's family history includes Diabetes in her mother; Hypertension in her mother and sister; No Known Problems in her father.    Medications and Allergies     Medications  Outpatient Medications Marked as Taking for the 2/26/24 encounter (Office Visit) with Jamaica Mays ACNP   Medication Sig Dispense Refill    fluticasone propionate (FLONASE) 50 mcg/actuation nasal spray 2 sprays (100 mcg total) by Each Nostril route 2 (two) times daily. 16 g 1    meloxicam (MOBIC) 15 MG tablet Take 1 tablet (15 mg total) by mouth daily as needed for Pain. I po daily prn pain.  Avoid all NSAIDs while taking Mobic 90 tablet 2    [DISCONTINUED] hydroCHLOROthiazide (HYDRODIURIL) 12.5 MG Tab Take 1 tablet (12.5 mg total) by mouth once daily. 90 tablet 1       Allergies  Review of patient's allergies indicates:   Allergen Reactions    Shellfish containing products Edema    Hydrocodone-acetaminophen Nausea And Vomiting     Other reaction(s): Unknown       Physical Examination     Vitals:    02/26/24 1435   BP: 119/84   Pulse: 81   Resp: 16   Temp: 98.2 °F (36.8 °C)     Physical Exam  Eyes:      Pupils: Pupils are equal, round, and reactive to light.   Cardiovascular:      Rate and Rhythm: Normal rate and regular rhythm.      Heart sounds: Normal heart sounds. No murmur heard.  Pulmonary:      Breath sounds: Normal breath sounds. No wheezing, rhonchi or rales.   Abdominal:      General: Bowel sounds are normal.   Musculoskeletal:         General: No swelling.      Cervical back: Normal range of motion and neck supple.   Skin:     General: Skin is warm and dry.   Neurological:      Mental Status: She is alert and oriented to person, place, and time.          Lab Results   Component Value  "Date    WBC 7.91 02/26/2024    HGB 12.1 02/26/2024    HCT 36.4 (L) 02/26/2024    MCV 84.3 02/26/2024     02/26/2024        Sodium   Date Value Ref Range Status   02/26/2024 140 136 - 145 mmol/L Final     Potassium   Date Value Ref Range Status   02/26/2024 2.8 (L) 3.5 - 5.1 mmol/L Final     Chloride   Date Value Ref Range Status   02/26/2024 105 98 - 107 mmol/L Final     CO2   Date Value Ref Range Status   02/26/2024 32 21 - 32 mmol/L Final     Glucose   Date Value Ref Range Status   02/26/2024 110 (H) 74 - 106 mg/dL Final     BUN   Date Value Ref Range Status   02/26/2024 15 7 - 18 mg/dL Final     Creatinine   Date Value Ref Range Status   02/26/2024 0.98 0.55 - 1.02 mg/dL Final     Calcium   Date Value Ref Range Status   02/26/2024 9.6 8.5 - 10.1 mg/dL Final     Total Protein   Date Value Ref Range Status   02/26/2024 7.8 6.4 - 8.2 g/dL Final     Albumin   Date Value Ref Range Status   02/26/2024 3.5 3.5 - 5.0 g/dL Final     Bilirubin, Total   Date Value Ref Range Status   02/26/2024 0.3 >0.0 - 1.2 mg/dL Final     Alk Phos   Date Value Ref Range Status   02/26/2024 92 41 - 108 U/L Final     AST   Date Value Ref Range Status   02/26/2024 30 15 - 37 U/L Final     ALT   Date Value Ref Range Status   02/26/2024 38 13 - 56 U/L Final     Anion Gap   Date Value Ref Range Status   02/26/2024 6 (L) 7 - 16 mmol/L Final     eGFR   Date Value Ref Range Status   02/26/2024 70 >=60 mL/min/1.73m2 Final      Lab Results   Component Value Date    HGBA1C 5.8 02/26/2024      Lab Results   Component Value Date    CHOL 152 02/26/2024    CHOL 158 08/14/2023    CHOL 212 (H) 10/11/2022     Lab Results   Component Value Date    HDL 59 02/26/2024    HDL 42 08/14/2023    HDL 46 10/11/2022     Lab Results   Component Value Date    LDLCALC 81 02/26/2024    LDLCALC 96 08/14/2023    LDLCALC 148 10/11/2022     No results found for: "DLDL"  Lab Results   Component Value Date    TRIG 62 02/26/2024    TRIG 100 08/14/2023    TRIG 89 " 10/11/2022     Lab Results   Component Value Date    CHOLHDL 2.6 02/26/2024    CHOLHDL 3.8 08/14/2023    CHOLHDL 4.6 10/11/2022      Lab Results   Component Value Date    TSH 2.360 10/11/2022    FREET4 0.92 10/11/2022        Assessment and Plan (including Health Maintenance)      Problem List  Smart Sets  Document Outside HM   :    Plan:     1. Hypertension, unspecified type  Assessment & Plan:  BP Readings from Last 3 Encounters:   02/26/24 119/84   08/14/23 117/81   03/09/23 134/89    The current medical regimen is effective;  continue present plan and medications.      Orders:  -     CBC Auto Differential; Future; Expected date: 02/26/2024  -     Comprehensive Metabolic Panel; Future; Expected date: 02/26/2024  -     Microalbumin/Creatinine Ratio, Urine; Future; Expected date: 02/26/2024  -     amLODIPine (NORVASC) 10 MG tablet; Take 1 tablet (10 mg total) by mouth once daily.  Dispense: 90 tablet; Refill: 1  -     aspirin (ECOTRIN) 81 MG EC tablet; Take 1 tablet (81 mg total) by mouth once daily.  Dispense: 90 tablet; Refill: 1    2. Hyperlipidemia, unspecified hyperlipidemia type  Assessment & Plan:  Lab Results   Component Value Date    CHOL 152 02/26/2024    CHOL 158 08/14/2023    CHOL 212 (H) 10/11/2022     Lab Results   Component Value Date    HDL 59 02/26/2024    HDL 42 08/14/2023    HDL 46 10/11/2022     Lab Results   Component Value Date    LDLCALC 81 02/26/2024    LDLCALC 96 08/14/2023    LDLCALC 148 10/11/2022     Lab Results   Component Value Date    TRIG 62 02/26/2024    TRIG 100 08/14/2023    TRIG 89 10/11/2022       Lab Results   Component Value Date    CHOLHDL 2.6 02/26/2024    CHOLHDL 3.8 08/14/2023    CHOLHDL 4.6 10/11/2022    The current medical regimen is effective;  continue present plan and medications.      Orders:  -     Lipid Panel; Future; Expected date: 02/26/2024  -     pravastatin (PRAVACHOL) 10 MG tablet; Take 1 tablet (10 mg total) by mouth once daily.  Dispense: 90 tablet; Refill:  1    3. Screening for diabetes mellitus  -     Hemoglobin A1C; Future; Expected date: 02/26/2024    4. Allergic rhinitis due to other allergic trigger, unspecified seasonality  -     fexofenadine (ALLEGRA ALLERGY) 60 MG tablet; Take 1 tablet (60 mg total) by mouth once daily.  Dispense: 90 tablet; Refill: 1    Other orders  -     ergocalciferol (ERGOCALCIFEROL) 50,000 unit Cap; Take 1 capsule (50,000 Units total) by mouth every 7 days.  Dispense: 15 capsule; Refill: 1  -     varicella-zoster [id number], PF, (SHINGRIX) 50 mcg/0.5 mL injection; Inject 0.5 mLs into the muscle once. for 1 dose  Dispense: 1 each; Refill: 0  -     EScitalopram oxalate (LEXAPRO) 10 MG tablet; Take 1 tablet (10 mg total) by mouth once daily.  Dispense: 90 tablet; Refill: 1         There are no Patient Instructions on file for this visit.     Health Maintenance Due   Topic Date Due    Mammogram  04/01/2023         Health Maintenance Topics with due status: Not Due       Topic Last Completion Date    Cervical Cancer Screening 11/19/2021    Colorectal Cancer Screening 11/09/2022    Diabetes Urine Screening 02/26/2024    Lipid Panel 02/26/2024    Hemoglobin A1c 02/26/2024       Future Appointments   Date Time Provider Department Center   3/26/2024  3:00 PM Dain Garcia FNP [facility]   8/26/2024  8:20 AM Jamaica Mays ACNP [facility]            Signature:  VANDANA Mathis [facility] [address] [place] [address]  [phone]    Date of encounter: 2/26/24

## 2024-02-27 NOTE — ASSESSMENT & PLAN NOTE
BP Readings from Last 3 Encounters:   02/26/24 119/84   08/14/23 117/81   03/09/23 134/89    The current medical regimen is effective;  continue present plan and medications.

## 2024-03-26 ENCOUNTER — OFFICE VISIT (OUTPATIENT)
Dept: ORTHOPEDICS | Facility: CLINIC | Age: 54
End: 2024-03-26
Payer: COMMERCIAL

## 2024-03-26 VITALS — BODY MASS INDEX: 32.32 KG/M2 | HEIGHT: 65 IN | WEIGHT: 194 LBS

## 2024-03-26 DIAGNOSIS — Z91.199 NO-SHOW FOR APPOINTMENT: Primary | ICD-10-CM

## 2024-04-22 ENCOUNTER — OFFICE VISIT (OUTPATIENT)
Dept: ORTHOPEDICS | Facility: CLINIC | Age: 54
End: 2024-04-22
Payer: COMMERCIAL

## 2024-04-22 DIAGNOSIS — M17.11 PRIMARY OSTEOARTHRITIS OF RIGHT KNEE: Primary | ICD-10-CM

## 2024-04-22 PROCEDURE — 20610 DRAIN/INJ JOINT/BURSA W/O US: CPT | Mod: PBBFAC

## 2024-04-22 PROCEDURE — 99499 UNLISTED E&M SERVICE: CPT | Mod: S$PBB,,,

## 2024-04-22 PROCEDURE — 99211 OFF/OP EST MAY X REQ PHY/QHP: CPT | Mod: PBBFAC,25

## 2024-04-22 PROCEDURE — 99999PBSHW PR PBB SHADOW TECHNICAL ONLY FILED TO HB: Mod: PBBFAC,,,

## 2024-04-22 RX ORDER — BUPIVACAINE HYDROCHLORIDE 2.5 MG/ML
1 INJECTION, SOLUTION EPIDURAL; INFILTRATION; INTRACAUDAL
Status: DISCONTINUED | OUTPATIENT
Start: 2024-04-22 | End: 2024-04-22 | Stop reason: HOSPADM

## 2024-04-22 RX ORDER — TRIAMCINOLONE ACETONIDE 40 MG/ML
40 INJECTION, SUSPENSION INTRA-ARTICULAR; INTRAMUSCULAR
Status: DISCONTINUED | OUTPATIENT
Start: 2024-04-22 | End: 2024-04-22 | Stop reason: HOSPADM

## 2024-04-22 RX ADMIN — BUPIVACAINE HYDROCHLORIDE 1 ML: 2.5 INJECTION, SOLUTION EPIDURAL; INFILTRATION; INTRACAUDAL at 03:04

## 2024-04-22 RX ADMIN — TRIAMCINOLONE ACETONIDE 40 MG: 40 INJECTION, SUSPENSION INTRA-ARTICULAR; INTRAMUSCULAR at 03:04

## 2024-04-22 NOTE — PROGRESS NOTES
CC: No chief complaint on file.         Sandra Pérez is a 53 y.o. female seen today for follow up of No chief complaint on file.  Patient presents today requesting repeat steroid injection right knee.  Patient is known to Dr. Alegria for previous right knee arthroscopy in 2022.  Patient has had continued occasional pain and swelling of the right knee.  She denies any new fall or injury.  No other complaints today.      PAST MEDICAL HISTORY:   Past Medical History:   Diagnosis Date    Hyperlipemia     Hypertension     PONV (postoperative nausea and vomiting)     Pulmonary edema         PAST SURGICAL HISTORY:   Past Surgical History:   Procedure Laterality Date    ARTHROSCOPY OF KNEE Right 12/1/2022    Procedure: ARTHROSCOPY, KNEE WITH SHAVING OF MEDIAL FEMORAL CONDYLE;  Surgeon: Domo Alegria MD;  Location: [facility];  Service: Orthopedics;  Laterality: Right;    BILATERAL TUBAL LIGATION      BUNIONECTOMY      CHOLECYSTECTOMY      KNEE ARTHROSCOPY W/ MENISCECTOMY Right 12/1/2022    Procedure: ARTHROSCOPY, KNEE, WITH MEDIAL MENISCECTOMY;  Surgeon: Domo Alegria MD;  Location: [facility];  Service: Orthopedics;  Laterality: Right;    KNEE ARTHROSCOPY W/ PLICA EXCISION Right 12/1/2022    Procedure: EXCISION, PLICA, KNEE, ARTHROSCOPIC;  Surgeon: Domo Alegria MD;  Location: [facility];  Service: Orthopedics;  Laterality: Right;        ALLERGIES:   Review of patient's allergies indicates:   Allergen Reactions    Shellfish containing products Edema    Hydrocodone-acetaminophen Nausea And Vomiting     Other reaction(s): Unknown        MEDICATIONS :    Current Outpatient Medications:     amLODIPine (NORVASC) 10 MG tablet, Take 1 tablet (10 mg total) by mouth once daily., Disp: 90 tablet, Rfl: 1    aspirin (ECOTRIN) 81 MG EC tablet, Take 1 tablet (81 mg total) by mouth once daily., Disp: 90 tablet, Rfl: 1    ergocalciferol (ERGOCALCIFEROL) 50,000 unit Cap, Take  1 capsule (50,000 Units total) by mouth every 7 days., Disp: 15 capsule, Rfl: 1    EScitalopram oxalate (LEXAPRO) 10 MG tablet, Take 1 tablet (10 mg total) by mouth once daily., Disp: 90 tablet, Rfl: 1    fexofenadine (ALLEGRA ALLERGY) 60 MG tablet, Take 1 tablet (60 mg total) by mouth once daily., Disp: 90 tablet, Rfl: 1    fluticasone propionate (FLONASE) 50 mcg/actuation nasal spray, 2 sprays (100 mcg total) by Each Nostril route 2 (two) times daily., Disp: 16 g, Rfl: 1    meloxicam (MOBIC) 15 MG tablet, Take 1 tablet (15 mg total) by mouth daily as needed for Pain. I po daily prn pain.  Avoid all NSAIDs while taking Mobic, Disp: 90 tablet, Rfl: 2    pravastatin (PRAVACHOL) 10 MG tablet, Take 1 tablet (10 mg total) by mouth once daily., Disp: 90 tablet, Rfl: 1  No current facility-administered medications for this visit.    Facility-Administered Medications Ordered in Other Visits:     0.9%  NaCl infusion, , Intravenous, Continuous, Domo Alegria MD, Last Rate: 500 mL/hr at 12/01/22 1401, Rate Change at 12/01/22 1401    cefazolin (ANCEF) 2 gram in dextrose 5% 50 mL IVPB (premix), 2 g, Intravenous, On Call Procedure, Domo Alegria MD     SOCIAL HISTORY:   Social History     Socioeconomic History    Marital status: Single   Tobacco Use    Smoking status: Never    Smokeless tobacco: Never   Substance and Sexual Activity    Alcohol use: Yes     Comment: occasionally    Drug use: Never    Sexual activity: Yes     Partners: Male        FAMILY HISTORY:   Family History   Problem Relation Name Age of Onset    Hypertension Mother      Diabetes Mother      No Known Problems Father      Hypertension Sister            PHYSICAL EXAM:      There were no vitals filed for this visit.  There is no height or weight on file to calculate BMI.    GENERAL: Well-developed, well-nourished female . The patient is alert, oriented and cooperative.    EXTREMITIES:  Right knee was skin clean dry and intact, previous arthroscopy  incisions well healed, nontender to palpation, crepitus over patella with movement, good range of motion from 0-120 degrees, knee is stable to varus and valgus stress testing, neurovascularly intact      RADIOGRAPHIC FINDINGS:   No results found.     Patient Active Problem List    Diagnosis Date Noted    Primary osteoarthritis of right knee 08/14/2023    S/P arthroscopy of right knee 11/09/2022    History of colonic polyps 11/09/2022    Impaired fasting blood sugar 10/11/2022    Vitamin B deficiency 10/11/2022    Fatigue 10/11/2022    BMI 32.0-32.9,adult 10/11/2022    Intractable migraine without aura and without status migrainosus 09/20/2021    Other sleep apnea 09/20/2021    Hyperlipidemia 07/29/2021    Hypertension 07/29/2021    Vitamin D deficiency 07/29/2021     IMPRESSION AND PLAN:  Primary osteoarthritis right knee.  Requesting repeat steroid injection today, see procedural note for details.  Discussed that these can be repeated every 4 months as needed.  Follow up PRN.        No follow-ups on file.       Gwen Quiles PA-C      (Subject to voice recognition error, transcription service not allowed)

## 2024-04-22 NOTE — PROCEDURES
Large Joint Aspiration/Injection: R knee    Date/Time: 4/22/2024 3:00 PM    Performed by: Gwen Quiles PA  Authorized by: Gwen Quiles PA    Consent Done?:  Yes (Verbal)  Indications:  Arthritis and pain  Site marked: the procedure site was marked      Details:  Needle Size:  22 G  Approach:  Anterolateral  Location:  Knee  Site:  R knee  Medications:  1 mL BUPivacaine (PF) 0.25% (2.5 mg/ml) 0.25 % (2.5 mg/mL); 40 mg triamcinolone acetonide 40 mg/mL  Patient tolerance:  Patient tolerated the procedure well with no immediate complications

## 2024-05-30 DIAGNOSIS — E78.5 HYPERLIPIDEMIA, UNSPECIFIED HYPERLIPIDEMIA TYPE: ICD-10-CM

## 2024-05-30 RX ORDER — PRAVASTATIN SODIUM 10 MG/1
10 TABLET ORAL
Qty: 90 TABLET | Refills: 0 | Status: SHIPPED | OUTPATIENT
Start: 2024-05-30

## 2024-07-18 ENCOUNTER — PATIENT OUTREACH (OUTPATIENT)
Dept: FAMILY MEDICINE | Facility: CLINIC | Age: 54
End: 2024-07-18
Payer: COMMERCIAL

## 2024-08-26 ENCOUNTER — HOSPITAL ENCOUNTER (OUTPATIENT)
Dept: RADIOLOGY | Facility: HOSPITAL | Age: 54
Discharge: HOME | End: 2024-08-26
Payer: COMMERCIAL

## 2024-08-26 ENCOUNTER — OFFICE VISIT (OUTPATIENT)
Dept: ORTHOPEDICS | Facility: CLINIC | Age: 54
End: 2024-08-26
Payer: COMMERCIAL

## 2024-08-26 DIAGNOSIS — M17.11 PRIMARY OSTEOARTHRITIS OF RIGHT KNEE: Primary | ICD-10-CM

## 2024-08-26 DIAGNOSIS — M17.11 PRIMARY OSTEOARTHRITIS OF RIGHT KNEE: ICD-10-CM

## 2024-08-26 PROCEDURE — 20610 DRAIN/INJ JOINT/BURSA W/O US: CPT | Mod: PBBFAC

## 2024-08-26 PROCEDURE — 99999PBSHW PR PBB SHADOW TECHNICAL ONLY FILED TO HB: Mod: PBBFAC,,,

## 2024-08-26 PROCEDURE — 99212 OFFICE O/P EST SF 10 MIN: CPT | Mod: PBBFAC,25

## 2024-08-26 PROCEDURE — 99999 PR PBB SHADOW E&M-EST. PATIENT-LVL II: CPT | Mod: PBBFAC,,,

## 2024-08-26 PROCEDURE — 73564 X-RAY EXAM KNEE 4 OR MORE: CPT | Mod: TC,RT

## 2024-08-26 PROCEDURE — 73564 X-RAY EXAM KNEE 4 OR MORE: CPT | Mod: 26,RT,, | Performed by: RADIOLOGY

## 2024-08-26 RX ADMIN — BUPIVACAINE HYDROCHLORIDE 1 ML: 2.5 INJECTION, SOLUTION EPIDURAL; INFILTRATION; INTRACAUDAL at 03:08

## 2024-08-26 RX ADMIN — TRIAMCINOLONE ACETONIDE 40 MG: 40 INJECTION, SUSPENSION INTRA-ARTICULAR; INTRAMUSCULAR at 03:08

## 2024-09-04 RX ORDER — BUPIVACAINE HYDROCHLORIDE 2.5 MG/ML
1 INJECTION, SOLUTION EPIDURAL; INFILTRATION; INTRACAUDAL
Status: DISCONTINUED | OUTPATIENT
Start: 2024-08-26 | End: 2024-09-04 | Stop reason: HOSPADM

## 2024-09-04 RX ORDER — TRIAMCINOLONE ACETONIDE 40 MG/ML
40 INJECTION, SUSPENSION INTRA-ARTICULAR; INTRAMUSCULAR
Status: DISCONTINUED | OUTPATIENT
Start: 2024-08-26 | End: 2024-09-04 | Stop reason: HOSPADM

## 2024-09-04 NOTE — PROCEDURES
Large Joint Aspiration/Injection: R knee    Date/Time: 8/26/2024 3:20 PM    Performed by: Gwen Quiles PA  Authorized by: Gwen Quiles PA    Consent Done?:  Yes (Verbal)  Indications:  Arthritis and pain  Site marked: the procedure site was marked      Details:  Needle Size:  22 G  Approach:  Anterolateral  Location:  Knee  Site:  R knee  Medications:  1 mL BUPivacaine (PF) 0.25% (2.5 mg/ml) 0.25 % (2.5 mg/mL); 40 mg triamcinolone acetonide 40 mg/mL  Patient tolerance:  Patient tolerated the procedure well with no immediate complications

## 2024-09-04 NOTE — PROGRESS NOTES
CC:   Chief Complaint   Patient presents with    Right Knee - Pain          Sandra Pérez is a 54 y.o. female seen today for follow up of Pain of the Right Knee  Patient last seen by me on 04/22/2024 for primary osteoarthritis right knee.  At that visit she received an intra-articular steroid injection.  She reports several months if pain relief following the injection.  However pain of her right knee began again a proximally 2 weeks ago.  She denies any recent fall or injury.  No other complaints today.        PAST MEDICAL HISTORY:   Past Medical History:   Diagnosis Date    Hyperlipemia     Hypertension     PONV (postoperative nausea and vomiting)     Pulmonary edema         PAST SURGICAL HISTORY:   Past Surgical History:   Procedure Laterality Date    ARTHROSCOPY OF KNEE Right 12/1/2022    Procedure: ARTHROSCOPY, KNEE WITH SHAVING OF MEDIAL FEMORAL CONDYLE;  Surgeon: Domo Alegria MD;  Location: [facility];  Service: Orthopedics;  Laterality: Right;    BILATERAL TUBAL LIGATION      BUNIONECTOMY      CHOLECYSTECTOMY      KNEE ARTHROSCOPY W/ MENISCECTOMY Right 12/1/2022    Procedure: ARTHROSCOPY, KNEE, WITH MEDIAL MENISCECTOMY;  Surgeon: Domo Alegria MD;  Location: [facility];  Service: Orthopedics;  Laterality: Right;    KNEE ARTHROSCOPY W/ PLICA EXCISION Right 12/1/2022    Procedure: EXCISION, PLICA, KNEE, ARTHROSCOPIC;  Surgeon: Domo Alegria MD;  Location: [facility];  Service: Orthopedics;  Laterality: Right;        ALLERGIES:   Review of patient's allergies indicates:   Allergen Reactions    Shellfish containing products Edema    Hydrocodone-acetaminophen Nausea And Vomiting     Other reaction(s): Unknown        MEDICATIONS :    Current Outpatient Medications:     amLODIPine (NORVASC) 10 MG tablet, Take 1 tablet (10 mg total) by mouth once daily., Disp: 90 tablet, Rfl: 1    aspirin (ECOTRIN) 81 MG EC tablet, Take 1 tablet (81 mg total) by mouth once  daily., Disp: 90 tablet, Rfl: 1    ergocalciferol (ERGOCALCIFEROL) 50,000 unit Cap, Take 1 capsule (50,000 Units total) by mouth every 7 days., Disp: 15 capsule, Rfl: 1    EScitalopram oxalate (LEXAPRO) 10 MG tablet, Take 1 tablet (10 mg total) by mouth once daily., Disp: 90 tablet, Rfl: 1    fexofenadine (ALLEGRA ALLERGY) 60 MG tablet, Take 1 tablet (60 mg total) by mouth once daily., Disp: 90 tablet, Rfl: 1    fluticasone propionate (FLONASE) 50 mcg/actuation nasal spray, 2 sprays (100 mcg total) by Each Nostril route 2 (two) times daily., Disp: 16 g, Rfl: 1    meloxicam (MOBIC) 15 MG tablet, Take 1 tablet (15 mg total) by mouth daily as needed for Pain. I po daily prn pain.  Avoid all NSAIDs while taking Mobic, Disp: 90 tablet, Rfl: 2    pravastatin (PRAVACHOL) 10 MG tablet, Take 1 tablet by mouth once daily, Disp: 90 tablet, Rfl: 0  No current facility-administered medications for this visit.    Facility-Administered Medications Ordered in Other Visits:     0.9%  NaCl infusion, , Intravenous, Continuous, Domo Alegria MD, Last Rate: 500 mL/hr at 12/01/22 1401, Rate Change at 12/01/22 1401    cefazolin (ANCEF) 2 gram in dextrose 5% 50 mL IVPB (premix), 2 g, Intravenous, On Call Procedure, Domo Alegria MD     SOCIAL HISTORY:   Social History     Socioeconomic History    Marital status: Single   Tobacco Use    Smoking status: Never    Smokeless tobacco: Never   Substance and Sexual Activity    Alcohol use: Yes     Comment: occasionally    Drug use: Never    Sexual activity: Yes     Partners: Male        FAMILY HISTORY:   Family History   Problem Relation Name Age of Onset    Hypertension Mother      Diabetes Mother      No Known Problems Father      Hypertension Sister            PHYSICAL EXAM:      There were no vitals filed for this visit.  There is no height or weight on file to calculate BMI.    GENERAL: Well-developed, well-nourished female . The patient is alert, oriented and  cooperative.    EXTREMITIES:  Right knee was skin clean dry and intact, tenderness to palpation along medial and lateral joint lines, range of motion from 0-120 degrees, knee feels stable to varus and valgus stress testing, neurovascularly intact      RADIOGRAPHIC FINDINGS:   X-Ray Knee Complete 4 Or More Views Right    Result Date: 8/26/2024  EXAMINATION: XR KNEE COMP 4 OR MORE VIEWS RIGHT CLINICAL HISTORY: Unilateral primary osteoarthritis, right knee COMPARISON: 31 October 2022 TECHNIQUE: XR KNEE 4 VIEWS RIGHT FINDINGS: No evidence of fracture seen.  The alignment of the joints appears normal.  Moderate medial compartment degenerative change is present.  No soft tissue abnormality is seen.     Knee osteoarthrosis as described above. Electronically signed by: Tristen Cheek Date:    08/26/2024 Time:    15:46       Patient Active Problem List    Diagnosis Date Noted    Primary osteoarthritis of right knee 08/14/2023    S/P arthroscopy of right knee 11/09/2022    History of colonic polyps 11/09/2022    Impaired fasting blood sugar 10/11/2022    Vitamin B deficiency 10/11/2022    Fatigue 10/11/2022    BMI 32.0-32.9,adult 10/11/2022    Intractable migraine without aura and without status migrainosus 09/20/2021    Other sleep apnea 09/20/2021    Hyperlipidemia 07/29/2021    Hypertension 07/29/2021    Vitamin D deficiency 07/29/2021     IMPRESSION AND PLAN:  Primary osteoarthritis right knee.  Discussed all treatment options.  Repeat steroid injection today.  Discussed with the patient that these injections can be repeated every 4 months.  Follow up in 4 months if she wishes to repeat injection, otherwise p.r.n..        No follow-ups on file.       Gwen Quiles PA-C      (Subject to voice recognition error, transcription service not allowed)

## 2024-09-05 RX ORDER — ESCITALOPRAM OXALATE 10 MG/1
10 TABLET ORAL DAILY
Qty: 90 TABLET | Refills: 1 | Status: CANCELLED | OUTPATIENT
Start: 2024-09-05

## 2024-09-09 ENCOUNTER — OFFICE VISIT (OUTPATIENT)
Dept: FAMILY MEDICINE | Facility: CLINIC | Age: 54
End: 2024-09-09
Payer: COMMERCIAL

## 2024-09-09 VITALS
HEART RATE: 84 BPM | BODY MASS INDEX: 32.8 KG/M2 | TEMPERATURE: 99 F | SYSTOLIC BLOOD PRESSURE: 124 MMHG | WEIGHT: 196.88 LBS | HEIGHT: 65 IN | OXYGEN SATURATION: 100 % | DIASTOLIC BLOOD PRESSURE: 85 MMHG | RESPIRATION RATE: 16 BRPM

## 2024-09-09 DIAGNOSIS — R73.03 PRE-DIABETES: ICD-10-CM

## 2024-09-09 DIAGNOSIS — E78.5 HYPERLIPIDEMIA, UNSPECIFIED HYPERLIPIDEMIA TYPE: ICD-10-CM

## 2024-09-09 DIAGNOSIS — J30.89 ALLERGIC RHINITIS DUE TO OTHER ALLERGIC TRIGGER, UNSPECIFIED SEASONALITY: ICD-10-CM

## 2024-09-09 DIAGNOSIS — Z23 TETANUS-DIPHTHERIA VACCINATION ADMINISTERED AT CURRENT VISIT: ICD-10-CM

## 2024-09-09 DIAGNOSIS — I10 HYPERTENSION, UNSPECIFIED TYPE: Primary | ICD-10-CM

## 2024-09-09 LAB
ALBUMIN SERPL BCP-MCNC: 3.5 G/DL (ref 3.5–5)
ALBUMIN/GLOB SERPL: 0.8 {RATIO}
ALP SERPL-CCNC: 87 U/L (ref 41–108)
ALT SERPL W P-5'-P-CCNC: 27 U/L (ref 13–56)
ANION GAP SERPL CALCULATED.3IONS-SCNC: 9 MMOL/L (ref 7–16)
AST SERPL W P-5'-P-CCNC: 24 U/L (ref 15–37)
BASOPHILS # BLD AUTO: 0.05 K/UL (ref 0–0.2)
BASOPHILS NFR BLD AUTO: 0.7 % (ref 0–1)
BILIRUB SERPL-MCNC: 0.3 MG/DL (ref ?–1.2)
BUN SERPL-MCNC: 10 MG/DL (ref 7–18)
BUN/CREAT SERPL: 9 (ref 6–20)
CALCIUM SERPL-MCNC: 9.3 MG/DL (ref 8.5–10.1)
CHLORIDE SERPL-SCNC: 105 MMOL/L (ref 98–107)
CHOLEST SERPL-MCNC: 156 MG/DL (ref 0–200)
CHOLEST/HDLC SERPL: 3.6 {RATIO}
CO2 SERPL-SCNC: 30 MMOL/L (ref 21–32)
CREAT SERPL-MCNC: 1.1 MG/DL (ref 0.55–1.02)
DIFFERENTIAL METHOD BLD: ABNORMAL
EGFR (NO RACE VARIABLE) (RUSH/TITUS): 60 ML/MIN/1.73M2
EOSINOPHIL # BLD AUTO: 0.33 K/UL (ref 0–0.5)
EOSINOPHIL NFR BLD AUTO: 4.7 % (ref 1–4)
ERYTHROCYTE [DISTWIDTH] IN BLOOD BY AUTOMATED COUNT: 13.8 % (ref 11.5–14.5)
EST. AVERAGE GLUCOSE BLD GHB EST-MCNC: 120 MG/DL
GLOBULIN SER-MCNC: 4.6 G/DL (ref 2–4)
GLUCOSE SERPL-MCNC: 99 MG/DL (ref 74–106)
HBA1C MFR BLD HPLC: 5.8 % (ref 4.5–6.6)
HCT VFR BLD AUTO: 41 % (ref 38–47)
HDLC SERPL-MCNC: 43 MG/DL (ref 40–60)
HGB BLD-MCNC: 12.3 G/DL (ref 12–16)
IMM GRANULOCYTES # BLD AUTO: 0.01 K/UL (ref 0–0.04)
IMM GRANULOCYTES NFR BLD: 0.1 % (ref 0–0.4)
LDLC SERPL CALC-MCNC: 86 MG/DL
LDLC/HDLC SERPL: 2 {RATIO}
LYMPHOCYTES # BLD AUTO: 2.72 K/UL (ref 1–4.8)
LYMPHOCYTES NFR BLD AUTO: 38.9 % (ref 27–41)
MCH RBC QN AUTO: 27.3 PG (ref 27–31)
MCHC RBC AUTO-ENTMCNC: 30 G/DL (ref 32–36)
MCV RBC AUTO: 91.1 FL (ref 80–96)
MONOCYTES # BLD AUTO: 0.8 K/UL (ref 0–0.8)
MONOCYTES NFR BLD AUTO: 11.4 % (ref 2–6)
MPC BLD CALC-MCNC: 10.8 FL (ref 9.4–12.4)
NEUTROPHILS # BLD AUTO: 3.09 K/UL (ref 1.8–7.7)
NEUTROPHILS NFR BLD AUTO: 44.2 % (ref 53–65)
NONHDLC SERPL-MCNC: 113 MG/DL
NRBC # BLD AUTO: 0 X10E3/UL
NRBC, AUTO (.00): 0 %
PLATELET # BLD AUTO: 297 K/UL (ref 150–400)
POTASSIUM SERPL-SCNC: 3.4 MMOL/L (ref 3.5–5.1)
PROT SERPL-MCNC: 8.1 G/DL (ref 6.4–8.2)
RBC # BLD AUTO: 4.5 M/UL (ref 4.2–5.4)
SODIUM SERPL-SCNC: 141 MMOL/L (ref 136–145)
TRIGL SERPL-MCNC: 135 MG/DL (ref 35–150)
VLDLC SERPL-MCNC: 27 MG/DL
WBC # BLD AUTO: 7 K/UL (ref 4.5–11)

## 2024-09-09 PROCEDURE — 3066F NEPHROPATHY DOC TX: CPT | Mod: CPTII,,, | Performed by: NURSE PRACTITIONER

## 2024-09-09 PROCEDURE — 99214 OFFICE O/P EST MOD 30 MIN: CPT | Mod: 25,,, | Performed by: NURSE PRACTITIONER

## 2024-09-09 PROCEDURE — 1160F RVW MEDS BY RX/DR IN RCRD: CPT | Mod: CPTII,,, | Performed by: NURSE PRACTITIONER

## 2024-09-09 PROCEDURE — 3074F SYST BP LT 130 MM HG: CPT | Mod: CPTII,,, | Performed by: NURSE PRACTITIONER

## 2024-09-09 PROCEDURE — 3061F NEG MICROALBUMINURIA REV: CPT | Mod: CPTII,,, | Performed by: NURSE PRACTITIONER

## 2024-09-09 PROCEDURE — 3079F DIAST BP 80-89 MM HG: CPT | Mod: CPTII,,, | Performed by: NURSE PRACTITIONER

## 2024-09-09 PROCEDURE — 3008F BODY MASS INDEX DOCD: CPT | Mod: CPTII,,, | Performed by: NURSE PRACTITIONER

## 2024-09-09 PROCEDURE — 80061 LIPID PANEL: CPT | Mod: ,,, | Performed by: CLINICAL MEDICAL LABORATORY

## 2024-09-09 PROCEDURE — 90715 TDAP VACCINE 7 YRS/> IM: CPT | Mod: ,,, | Performed by: NURSE PRACTITIONER

## 2024-09-09 PROCEDURE — 96372 THER/PROPH/DIAG INJ SC/IM: CPT | Mod: ,,, | Performed by: NURSE PRACTITIONER

## 2024-09-09 PROCEDURE — 85025 COMPLETE CBC W/AUTO DIFF WBC: CPT | Mod: ,,, | Performed by: CLINICAL MEDICAL LABORATORY

## 2024-09-09 PROCEDURE — 90471 IMMUNIZATION ADMIN: CPT | Mod: 59,,, | Performed by: NURSE PRACTITIONER

## 2024-09-09 PROCEDURE — 80053 COMPREHEN METABOLIC PANEL: CPT | Mod: ,,, | Performed by: CLINICAL MEDICAL LABORATORY

## 2024-09-09 PROCEDURE — 3044F HG A1C LEVEL LT 7.0%: CPT | Mod: CPTII,,, | Performed by: NURSE PRACTITIONER

## 2024-09-09 PROCEDURE — 83036 HEMOGLOBIN GLYCOSYLATED A1C: CPT | Mod: ,,, | Performed by: CLINICAL MEDICAL LABORATORY

## 2024-09-09 PROCEDURE — 1159F MED LIST DOCD IN RCRD: CPT | Mod: CPTII,,, | Performed by: NURSE PRACTITIONER

## 2024-09-09 RX ORDER — AMLODIPINE BESYLATE 10 MG/1
10 TABLET ORAL DAILY
Qty: 90 TABLET | Refills: 1 | Status: SHIPPED | OUTPATIENT
Start: 2024-09-09

## 2024-09-09 RX ORDER — BETAMETHASONE SODIUM PHOSPHATE AND BETAMETHASONE ACETATE 3; 3 MG/ML; MG/ML
6 INJECTION, SUSPENSION INTRA-ARTICULAR; INTRALESIONAL; INTRAMUSCULAR; SOFT TISSUE
Status: COMPLETED | OUTPATIENT
Start: 2024-09-09 | End: 2024-09-09

## 2024-09-09 RX ORDER — PRAVASTATIN SODIUM 10 MG/1
10 TABLET ORAL DAILY
Qty: 90 TABLET | Refills: 1 | Status: SHIPPED | OUTPATIENT
Start: 2024-09-09

## 2024-09-09 RX ORDER — ERGOCALCIFEROL 1.25 MG/1
50000 CAPSULE ORAL
Qty: 15 CAPSULE | Refills: 1 | Status: SHIPPED | OUTPATIENT
Start: 2024-09-09

## 2024-09-09 RX ORDER — FEXOFENADINE HCL 60 MG/1
60 TABLET, FILM COATED ORAL DAILY
Qty: 90 TABLET | Refills: 1 | Status: SHIPPED | OUTPATIENT
Start: 2024-09-09

## 2024-09-09 RX ORDER — CEFTRIAXONE 1 G/1
1 INJECTION, POWDER, FOR SOLUTION INTRAMUSCULAR; INTRAVENOUS
Status: COMPLETED | OUTPATIENT
Start: 2024-09-09 | End: 2024-09-09

## 2024-09-09 RX ORDER — ASPIRIN 81 MG/1
81 TABLET ORAL DAILY
Qty: 90 TABLET | Refills: 1 | Status: SHIPPED | OUTPATIENT
Start: 2024-09-09

## 2024-09-09 RX ORDER — AZITHROMYCIN 250 MG/1
TABLET, FILM COATED ORAL
Qty: 6 TABLET | Refills: 0 | Status: SHIPPED | OUTPATIENT
Start: 2024-09-09 | End: 2024-09-14

## 2024-09-09 RX ADMIN — BETAMETHASONE SODIUM PHOSPHATE AND BETAMETHASONE ACETATE 6 MG: 3; 3 INJECTION, SUSPENSION INTRA-ARTICULAR; INTRALESIONAL; INTRAMUSCULAR; SOFT TISSUE at 04:09

## 2024-09-09 RX ADMIN — CEFTRIAXONE 1 G: 1 INJECTION, POWDER, FOR SOLUTION INTRAMUSCULAR; INTRAVENOUS at 04:09

## 2024-09-09 NOTE — ASSESSMENT & PLAN NOTE
BP Readings from Last 3 Encounters:   09/09/24 124/85   02/26/24 119/84   08/14/23 117/81    The current medical regimen is effective;  continue present plan and medications.

## 2024-09-09 NOTE — PROGRESS NOTES
VANDANA Mathis   [facility] [address] [place] [address]  [phone]      PATIENT NAME: Sandra Pérez  : 1970  DATE: 24  MRN: [id number]      Billing Provider: VANDANA Mathis  Level of Service:   Patient PCP Information       Provider PCP Type    VANDANA Mathis General            Reason for Visit / Chief Complaint: Hypertension, Hyperlipidemia, and Follow-up (6  mos)       Update PCP  Update Chief Complaint         History of Present Illness / Problem Focused Workflow     Sandra Pérez presents to the clinic with Hypertension, Hyperlipidemia, and Follow-up (6  mos)     Pt presents for routine follow up with lab and med refills. Overall doing well.      BP appears  controlled today. Home meds reviewed  The current medical regimen is effective;  continue present plan and medications. Recommended patient to check home readings to monitor and see me for followup as scheduled or sooner as needed.   Discussed sodium restriction, maintaining ideal body weight and regular exercise program as physiologic means to continue to achieve blood pressure control in addition to medication compliance.  Patient was educated that both decongestant and anti-inflammatory medication may raise blood pressure.  Advised to monitor BP at home. Advised on optimal BP readings - SBP < 130 & DBP < 80. Advised to call office for any persistent BP elevation and may have to prescribe or adjust BP med(s).  Recommended DASH diet, stay well hydrated with water daily, eliminate or decrease caffeinated and high calorie drinks, increase physical activity, and lose weight if BMI > 25.0. Written patient education information provided to patient with goals and recommendations to assist with BP management.       Discussed need for low fat/low cholesterol diet, regular exercise, and weight control.   Cardiovascular risk and  specific lipid/LDL goals reviewed.        Review of Systems     Review of Systems   Constitutional:  Negative for fatigue and fever.   HENT:  Negative for nasal congestion and sore throat.    Eyes:  Negative for visual disturbance.   Respiratory:  Negative for chest tightness and shortness of breath.    Cardiovascular:  Negative for chest pain and leg swelling.   Gastrointestinal:  Negative for abdominal pain and change in bowel habit.   Endocrine: Negative for polydipsia, polyphagia and polyuria.   Genitourinary:  Negative for dysuria and hematuria.   Musculoskeletal:  Negative for back pain and leg pain.   Integumentary:  Negative for rash.   Neurological:  Negative for dizziness, syncope, weakness and light-headedness.        Medical / Social / Family History     Past Medical History:   Diagnosis Date    Hyperlipemia     Hypertension     PONV (postoperative nausea and vomiting)     Pulmonary edema        Past Surgical History:   Procedure Laterality Date    ARTHROSCOPY OF KNEE Right 12/1/2022    Procedure: ARTHROSCOPY, KNEE WITH SHAVING OF MEDIAL FEMORAL CONDYLE;  Surgeon: Domo Alegria MD;  Location: [facility];  Service: Orthopedics;  Laterality: Right;    BILATERAL TUBAL LIGATION      BUNIONECTOMY      CHOLECYSTECTOMY      KNEE ARTHROSCOPY W/ MENISCECTOMY Right 12/1/2022    Procedure: ARTHROSCOPY, KNEE, WITH MEDIAL MENISCECTOMY;  Surgeon: Domo Alegria MD;  Location: [facility];  Service: Orthopedics;  Laterality: Right;    KNEE ARTHROSCOPY W/ PLICA EXCISION Right 12/1/2022    Procedure: EXCISION, PLICA, KNEE, ARTHROSCOPIC;  Surgeon: Domo Alegria MD;  Location: [facility];  Service: Orthopedics;  Laterality: Right;       Social History  Ms. Pérez  reports that she has never smoked. She has never used smokeless tobacco. She reports current alcohol use. She reports that she does not use drugs.    Family History  Ms. Pérez's family history includes Diabetes in her  mother; Hypertension in her mother and sister; No Known Problems in her father.    Medications and Allergies     Medications  Outpatient Medications Marked as Taking for the 9/9/24 encounter (Office Visit) with [place] Mays ACNP   Medication Sig Dispense Refill    fluticasone propionate (FLONASE) 50 mcg/actuation nasal spray 2 sprays (100 mcg total) by Each Nostril route 2 (two) times daily. 16 g 1    meloxicam (MOBIC) 15 MG tablet Take 1 tablet (15 mg total) by mouth daily as needed for Pain. I po daily prn pain.  Avoid all NSAIDs while taking Mobic 90 tablet 2    [DISCONTINUED] EScitalopram oxalate (LEXAPRO) 10 MG tablet Take 1 tablet (10 mg total) by mouth once daily. 90 tablet 1     Current Facility-Administered Medications for the 9/9/24 encounter (Office Visit) with [place] Mays ACNP   Medication Dose Route Frequency Provider Last Rate Last Admin    [COMPLETED] betamethasone acetate-betamethasone sodium phosphate injection 6 mg  6 mg Intramuscular 1 time in Clinic/HOD Jamaica Mays ACNP   6 mg at 09/09/24 1626    [COMPLETED] cefTRIAXone injection 1 g  1 g Intramuscular 1 time in Clinic/HOD Jamaica Mays ACNP   1 g at 09/09/24 1625    [COMPLETED] Tdap (BOOSTRIX) vaccine injection 0.5 mL  0.5 mL Intramuscular 1 time in Clinic/HOD Jamaica Mays ACNP   0.5 mL at 09/09/24 1627       Allergies  Review of patient's allergies indicates:   Allergen Reactions    Shellfish containing products Edema    Hydrocodone-acetaminophen Nausea And Vomiting     Other reaction(s): Unknown       Physical Examination     Vitals:    09/09/24 1510   BP: 124/85   Pulse: 84   Resp: 16   Temp: 98.8 °F (37.1 °C)     Physical Exam  Eyes:      Pupils: Pupils are equal, round, and reactive to light.   Cardiovascular:      Rate and Rhythm: Normal rate and regular rhythm.      Heart sounds: Normal heart sounds. No murmur heard.  Pulmonary:      Breath sounds: Normal breath sounds. No wheezing, rhonchi or rales.    Abdominal:      General: Bowel sounds are normal.      Palpations: Abdomen is soft.   Musculoskeletal:         General: No swelling.      Cervical back: Normal range of motion and neck supple.   Skin:     General: Skin is warm and dry.   Neurological:      Mental Status: She is alert and oriented to person, place, and time.          Lab Results   Component Value Date    WBC 7.91 02/26/2024    HGB 12.1 02/26/2024    HCT 36.4 (L) 02/26/2024    MCV 84.3 02/26/2024     02/26/2024        Sodium   Date Value Ref Range Status   02/26/2024 140 136 - 145 mmol/L Final     Potassium   Date Value Ref Range Status   02/26/2024 2.8 (L) 3.5 - 5.1 mmol/L Final     Chloride   Date Value Ref Range Status   02/26/2024 105 98 - 107 mmol/L Final     CO2   Date Value Ref Range Status   02/26/2024 32 21 - 32 mmol/L Final     Glucose   Date Value Ref Range Status   02/26/2024 110 (H) 74 - 106 mg/dL Final     BUN   Date Value Ref Range Status   02/26/2024 15 7 - 18 mg/dL Final     Creatinine   Date Value Ref Range Status   02/26/2024 0.98 0.55 - 1.02 mg/dL Final     Calcium   Date Value Ref Range Status   02/26/2024 9.6 8.5 - 10.1 mg/dL Final     Total Protein   Date Value Ref Range Status   02/26/2024 7.8 6.4 - 8.2 g/dL Final     Albumin   Date Value Ref Range Status   02/26/2024 3.5 3.5 - 5.0 g/dL Final     Bilirubin, Total   Date Value Ref Range Status   02/26/2024 0.3 >0.0 - 1.2 mg/dL Final     Alk Phos   Date Value Ref Range Status   02/26/2024 92 41 - 108 U/L Final     AST   Date Value Ref Range Status   02/26/2024 30 15 - 37 U/L Final     ALT   Date Value Ref Range Status   02/26/2024 38 13 - 56 U/L Final     Anion Gap   Date Value Ref Range Status   02/26/2024 6 (L) 7 - 16 mmol/L Final     eGFR   Date Value Ref Range Status   02/26/2024 70 >=60 mL/min/1.73m2 Final      Lab Results   Component Value Date    HGBA1C 5.8 02/26/2024      Lab Results   Component Value Date    CHOL 152 02/26/2024    CHOL 158 08/14/2023    CHOL 212  "(H) 10/11/2022     Lab Results   Component Value Date    HDL 59 02/26/2024    HDL 42 08/14/2023    HDL 46 10/11/2022     Lab Results   Component Value Date    LDLCALC 81 02/26/2024    LDLCALC 96 08/14/2023    LDLCALC 148 10/11/2022     No results found for: "DLDL"  Lab Results   Component Value Date    TRIG 62 02/26/2024    TRIG 100 08/14/2023    TRIG 89 10/11/2022     Lab Results   Component Value Date    CHOLHDL 2.6 02/26/2024    CHOLHDL 3.8 08/14/2023    CHOLHDL 4.6 10/11/2022      Lab Results   Component Value Date    TSH 2.360 10/11/2022    FREET4 0.92 10/11/2022        Assessment and Plan (including Health Maintenance)      Problem List  Smart Sets  Document Outside HM   :    Plan:     1. Hypertension, unspecified type  Assessment & Plan:  BP Readings from Last 3 Encounters:   09/09/24 124/85   02/26/24 119/84   08/14/23 117/81    The current medical regimen is effective;  continue present plan and medications.      Orders:  -     CBC Auto Differential; Future; Expected date: 09/09/2024  -     Comprehensive Metabolic Panel; Future; Expected date: 09/09/2024  -     amLODIPine (NORVASC) 10 MG tablet; Take 1 tablet (10 mg total) by mouth once daily.  Dispense: 90 tablet; Refill: 1  -     aspirin (ECOTRIN) 81 MG EC tablet; Take 1 tablet (81 mg total) by mouth once daily.  Dispense: 90 tablet; Refill: 1    2. Hyperlipidemia, unspecified hyperlipidemia type  Assessment & Plan:  Lab Results   Component Value Date    CHOL 152 02/26/2024    CHOL 158 08/14/2023    CHOL 212 (H) 10/11/2022     Lab Results   Component Value Date    HDL 59 02/26/2024    HDL 42 08/14/2023    HDL 46 10/11/2022     Lab Results   Component Value Date    LDLCALC 81 02/26/2024    LDLCALC 96 08/14/2023    LDLCALC 148 10/11/2022     Lab Results   Component Value Date    TRIG 62 02/26/2024    TRIG 100 08/14/2023    TRIG 89 10/11/2022       Lab Results   Component Value Date    CHOLHDL 2.6 02/26/2024    CHOLHDL 3.8 08/14/2023    CHOLHDL 4.6 " 10/11/2022    The current medical regimen is effective;  continue present plan and medications.      Orders:  -     Lipid Panel; Future; Expected date: 09/09/2024  -     pravastatin (PRAVACHOL) 10 MG tablet; Take 1 tablet (10 mg total) by mouth once daily.  Dispense: 90 tablet; Refill: 1    3. Pre-diabetes  -     Hemoglobin A1C; Future; Expected date: 09/09/2024    4. Allergic rhinitis due to other allergic trigger, unspecified seasonality  -     fexofenadine (ALLEGRA ALLERGY) 60 MG tablet; Take 1 tablet (60 mg total) by mouth once daily.  Dispense: 90 tablet; Refill: 1    5. Tetanus-diphtheria vaccination administered at current visit  -     Tdap (BOOSTRIX) vaccine injection 0.5 mL    Other orders  -     ergocalciferol (ERGOCALCIFEROL) 50,000 unit Cap; Take 1 capsule (50,000 Units total) by mouth every 7 days.  Dispense: 15 capsule; Refill: 1  -     cefTRIAXone injection 1 g  -     betamethasone acetate-betamethasone sodium phosphate injection 6 mg  -     azithromycin (Z-SABINE) 250 MG tablet; Take 2 tablets by mouth on day 1; Take 1 tablet by mouth on days 2-5  Dispense: 6 tablet; Refill: 0         There are no Patient Instructions on file for this visit.     Health Maintenance Due   Topic Date Due    Shingles Vaccine (1 of 2) Never done         Health Maintenance Topics with due status: Not Due       Topic Last Completion Date    Colorectal Cancer Screening 11/09/2022    Mammogram 01/10/2024    Diabetes Urine Screening 02/26/2024    Lipid Panel 02/26/2024    Hemoglobin A1c 02/26/2024    TETANUS VACCINE 09/09/2024       Future Appointments   Date Time Provider Department Center   3/13/2025  8:00 AM Jamaica Mays ACNP [facility] [place]            Signature:  [facility] [address] [place] [address]  [phone]    Date of encounter: 9/9/24

## 2025-01-06 ENCOUNTER — OFFICE VISIT (OUTPATIENT)
Dept: FAMILY MEDICINE | Facility: CLINIC | Age: 55
End: 2025-01-06
Payer: COMMERCIAL

## 2025-01-06 VITALS
HEIGHT: 65 IN | BODY MASS INDEX: 33.19 KG/M2 | DIASTOLIC BLOOD PRESSURE: 91 MMHG | SYSTOLIC BLOOD PRESSURE: 135 MMHG | TEMPERATURE: 98 F | OXYGEN SATURATION: 100 % | RESPIRATION RATE: 16 BRPM | WEIGHT: 199.19 LBS | HEART RATE: 87 BPM

## 2025-01-06 DIAGNOSIS — Z01.419 WELL WOMAN EXAM WITH ROUTINE GYNECOLOGICAL EXAM: Primary | ICD-10-CM

## 2025-01-06 PROCEDURE — 3080F DIAST BP >= 90 MM HG: CPT | Mod: CPTII,,, | Performed by: NURSE PRACTITIONER

## 2025-01-06 PROCEDURE — 1159F MED LIST DOCD IN RCRD: CPT | Mod: CPTII,,, | Performed by: NURSE PRACTITIONER

## 2025-01-06 PROCEDURE — 3008F BODY MASS INDEX DOCD: CPT | Mod: CPTII,,, | Performed by: NURSE PRACTITIONER

## 2025-01-06 PROCEDURE — 99396 PREV VISIT EST AGE 40-64: CPT | Mod: ,,, | Performed by: NURSE PRACTITIONER

## 2025-01-06 PROCEDURE — 1160F RVW MEDS BY RX/DR IN RCRD: CPT | Mod: CPTII,,, | Performed by: NURSE PRACTITIONER

## 2025-01-06 PROCEDURE — 3075F SYST BP GE 130 - 139MM HG: CPT | Mod: CPTII,,, | Performed by: NURSE PRACTITIONER

## 2025-01-06 PROCEDURE — 88142 CYTOPATH C/V THIN LAYER: CPT | Mod: TC,GCY | Performed by: NURSE PRACTITIONER

## 2025-01-06 NOTE — PROGRESS NOTES
VANDANA Mathis   [facility] [address] [place] [address]  [phone]      PATIENT NAME: Sandra Pérez  : 1970  DATE: 25  MRN: [id number]      Billing Provider: VANDANA Mathis  Level of Service:   Patient PCP Information       Provider PCP Type    VANDANA Mathis General            Reason for Visit / Chief Complaint: pap       Update PCP  Update Chief Complaint         History of Present Illness / Problem Focused Workflow     Sandra Pérez presents to the clinic with pap     Pt for routine gynecological exam.  Overall doing well.  She denies any vaginal pain or discharge        Review of Systems     Review of Systems   Constitutional:  Negative for fatigue and fever.   HENT:  Negative for nasal congestion and sore throat.    Eyes:  Negative for visual disturbance.   Respiratory:  Negative for chest tightness and shortness of breath.    Cardiovascular:  Negative for chest pain and leg swelling.   Gastrointestinal:  Negative for abdominal pain and change in bowel habit.   Endocrine: Negative for polydipsia, polyphagia and polyuria.   Genitourinary:  Negative for dysuria and hematuria.   Musculoskeletal:  Negative for back pain and leg pain.   Integumentary:  Negative for rash.   Neurological:  Negative for dizziness, syncope, weakness and light-headedness.        Medical / Social / Family History     Past Medical History:   Diagnosis Date    Hyperlipemia     Hypertension     PONV (postoperative nausea and vomiting)     Pulmonary edema        Past Surgical History:   Procedure Laterality Date    ARTHROSCOPY OF KNEE Right 2022    Procedure: ARTHROSCOPY, KNEE WITH SHAVING OF MEDIAL FEMORAL CONDYLE;  Surgeon: Domo Alegria MD;  Location: [facility];  Service: Orthopedics;  Laterality: Right;    BILATERAL TUBAL LIGATION      BUNIONECTOMY      CHOLECYSTECTOMY      KNEE  ARTHROSCOPY W/ MENISCECTOMY Right 12/1/2022    Procedure: ARTHROSCOPY, KNEE, WITH MEDIAL MENISCECTOMY;  Surgeon: Domo Alegria MD;  Location: [facility];  Service: Orthopedics;  Laterality: Right;    KNEE ARTHROSCOPY W/ PLICA EXCISION Right 12/1/2022    Procedure: EXCISION, PLICA, KNEE, ARTHROSCOPIC;  Surgeon: Domo Alegria MD;  Location: [facility];  Service: Orthopedics;  Laterality: Right;       Social History  Ms. Pérez  reports that she has never smoked. She has never used smokeless tobacco. She reports current alcohol use. She reports that she does not use drugs.    Family History  Ms. Pérez's family history includes Diabetes in her mother; Hypertension in her mother and sister; No Known Problems in her father.    Medications and Allergies     Medications  Outpatient Medications Marked as Taking for the 1/6/25 encounter (Office Visit) with Jamaica Mays ACNP   Medication Sig Dispense Refill    amLODIPine (NORVASC) 10 MG tablet Take 1 tablet (10 mg total) by mouth once daily. 90 tablet 1    aspirin (ECOTRIN) 81 MG EC tablet Take 1 tablet (81 mg total) by mouth once daily. 90 tablet 1    ergocalciferol (ERGOCALCIFEROL) 50,000 unit Cap Take 1 capsule (50,000 Units total) by mouth every 7 days. 15 capsule 1    fexofenadine (ALLEGRA ALLERGY) 60 MG tablet Take 1 tablet (60 mg total) by mouth once daily. 90 tablet 1    fluticasone propionate (FLONASE) 50 mcg/actuation nasal spray 2 sprays (100 mcg total) by Each Nostril route 2 (two) times daily. 16 g 1    meloxicam (MOBIC) 15 MG tablet Take 1 tablet (15 mg total) by mouth daily as needed for Pain. I po daily prn pain.  Avoid all NSAIDs while taking Mobic 90 tablet 2    pravastatin (PRAVACHOL) 10 MG tablet Take 1 tablet (10 mg total) by mouth once daily. 90 tablet 1       Allergies  Review of patient's allergies indicates:   Allergen Reactions    Shellfish containing products Edema    Hydrocodone-acetaminophen Nausea And Vomiting      Other reaction(s): Unknown       Physical Examination     Vitals:    01/06/25 1351   BP: (!) 135/91   Pulse: 87   Resp: 16   Temp: 98.3 °F (36.8 °C)     Physical Exam  Exam conducted with a chaperone present.   Eyes:      Pupils: Pupils are equal, round, and reactive to light.   Cardiovascular:      Rate and Rhythm: Normal rate and regular rhythm.      Heart sounds: Normal heart sounds. No murmur heard.  Pulmonary:      Breath sounds: Normal breath sounds. No wheezing, rhonchi or rales.   Abdominal:      General: Bowel sounds are normal.      Hernia: There is no hernia in the right inguinal area.   Genitourinary:     Exam position: Supine.      Labia:         Right: No tenderness or lesion.         Left: No tenderness or lesion.       Vagina: Normal.      Cervix: Normal.      Uterus: Normal.       Adnexa:         Right: No tenderness.          Left: No tenderness.     Musculoskeletal:         General: No swelling.      Cervical back: Normal range of motion and neck supple.   Lymphadenopathy:      Lower Body: No right inguinal adenopathy. No left inguinal adenopathy.   Skin:     General: Skin is warm and dry.   Neurological:      Mental Status: She is alert and oriented to person, place, and time.          Lab Results   Component Value Date    WBC 7.00 09/09/2024    HGB 12.3 09/09/2024    HCT 41.0 09/09/2024    MCV 91.1 09/09/2024     09/09/2024        Sodium   Date Value Ref Range Status   09/09/2024 141 136 - 145 mmol/L Final     Potassium   Date Value Ref Range Status   09/09/2024 3.4 (L) 3.5 - 5.1 mmol/L Final     Chloride   Date Value Ref Range Status   09/09/2024 105 98 - 107 mmol/L Final     CO2   Date Value Ref Range Status   09/09/2024 30 21 - 32 mmol/L Final     Glucose   Date Value Ref Range Status   09/09/2024 99 74 - 106 mg/dL Final     BUN   Date Value Ref Range Status   09/09/2024 10 7 - 18 mg/dL Final     Creatinine   Date Value Ref Range Status   09/09/2024 1.10 (H) 0.55 - 1.02 mg/dL Final  "    Calcium   Date Value Ref Range Status   09/09/2024 9.3 8.5 - 10.1 mg/dL Final     Total Protein   Date Value Ref Range Status   09/09/2024 8.1 6.4 - 8.2 g/dL Final     Albumin   Date Value Ref Range Status   09/09/2024 3.5 3.5 - 5.0 g/dL Final     Bilirubin, Total   Date Value Ref Range Status   09/09/2024 0.3 >0.0 - 1.2 mg/dL Final     Alk Phos   Date Value Ref Range Status   09/09/2024 87 41 - 108 U/L Final     AST   Date Value Ref Range Status   09/09/2024 24 15 - 37 U/L Final     ALT   Date Value Ref Range Status   09/09/2024 27 13 - 56 U/L Final     Anion Gap   Date Value Ref Range Status   09/09/2024 9 7 - 16 mmol/L Final     eGFR   Date Value Ref Range Status   09/09/2024 60 >=60 mL/min/1.73m2 Final      Lab Results   Component Value Date    HGBA1C 5.8 09/09/2024      Lab Results   Component Value Date    CHOL 156 09/09/2024    CHOL 152 02/26/2024    CHOL 158 08/14/2023     Lab Results   Component Value Date    HDL 43 09/09/2024    HDL 59 02/26/2024    HDL 42 08/14/2023     Lab Results   Component Value Date    LDLCALC 86 09/09/2024    LDLCALC 81 02/26/2024    LDLCALC 96 08/14/2023     No results found for: "DLDL"  Lab Results   Component Value Date    TRIG 135 09/09/2024    TRIG 62 02/26/2024    TRIG 100 08/14/2023     Lab Results   Component Value Date    CHOLHDL 3.6 09/09/2024    CHOLHDL 2.6 02/26/2024    CHOLHDL 3.8 08/14/2023      Lab Results   Component Value Date    TSH 2.360 10/11/2022    FREET4 0.92 10/11/2022        Assessment and Plan (including Health Maintenance)      Problem List  Smart Sets  Document Outside HM   :    Plan:     1. Well woman exam with routine gynecological exam  -     ThinPrep Pap Test; Future; Expected date: 01/07/2025         There are no Patient Instructions on file for this visit.     Health Maintenance Due   Topic Date Due    Cervical Cancer Screening  11/19/2024    Mammogram  01/10/2025         Health Maintenance Topics with due status: Not Due       Topic Last " Completion Date    Colorectal Cancer Screening 11/09/2022    Diabetes Urine Screening 02/26/2024    TETANUS VACCINE 09/09/2024    Lipid Panel 09/09/2024    Hemoglobin A1c 09/09/2024    RSV Vaccine (Age 60+ and Pregnant patients) Not Due       Future Appointments   Date Time Provider Department Center   3/13/2025  8:00 AM Jamaica Mays ACNP [facility] [place]            Signature:  VANDANA Mathis [facility] [address] [place] [address]  [phone]    Date of encounter: 1/6/25

## 2025-01-08 LAB
GH SERPL-MCNC: NORMAL NG/ML
INSULIN SERPL-ACNC: NORMAL U[IU]/ML
LAB AP CLINICAL INFORMATION: NORMAL
LAB AP GYN INTERPRETATION: NEGATIVE
LAB AP PAP DISCLAIMER COMMENTS: NORMAL
RENIN PLAS-CCNC: NORMAL NG/ML/H

## 2025-01-23 DIAGNOSIS — J30.89 ALLERGIC RHINITIS DUE TO OTHER ALLERGIC TRIGGER, UNSPECIFIED SEASONALITY: ICD-10-CM

## 2025-01-23 RX ORDER — FLUTICASONE PROPIONATE 50 MCG
2 SPRAY, SUSPENSION (ML) NASAL 2 TIMES DAILY
Qty: 16 G | Refills: 0 | Status: SHIPPED | OUTPATIENT
Start: 2025-01-23

## 2025-02-28 DIAGNOSIS — Z12.31 ENCOUNTER FOR SCREENING MAMMOGRAM FOR MALIGNANT NEOPLASM OF BREAST: Primary | ICD-10-CM

## 2025-05-18 ENCOUNTER — PATIENT MESSAGE (OUTPATIENT)
Dept: FAMILY MEDICINE | Facility: CLINIC | Age: 55
End: 2025-05-18
Payer: COMMERCIAL

## 2025-05-30 ENCOUNTER — PATIENT MESSAGE (OUTPATIENT)
Dept: FAMILY MEDICINE | Facility: CLINIC | Age: 55
End: 2025-05-30
Payer: COMMERCIAL

## 2025-08-01 ENCOUNTER — PATIENT MESSAGE (OUTPATIENT)
Facility: HOSPITAL | Age: 55
End: 2025-08-01
Payer: COMMERCIAL

## 2025-08-19 ENCOUNTER — TELEPHONE (OUTPATIENT)
Dept: FAMILY MEDICINE | Facility: CLINIC | Age: 55
End: 2025-08-19
Payer: COMMERCIAL

## 2025-08-25 ENCOUNTER — OFFICE VISIT (OUTPATIENT)
Dept: FAMILY MEDICINE | Facility: CLINIC | Age: 55
End: 2025-08-25
Payer: COMMERCIAL

## 2025-08-25 VITALS
BODY MASS INDEX: 33.54 KG/M2 | WEIGHT: 201.31 LBS | HEART RATE: 79 BPM | DIASTOLIC BLOOD PRESSURE: 84 MMHG | TEMPERATURE: 99 F | RESPIRATION RATE: 16 BRPM | SYSTOLIC BLOOD PRESSURE: 126 MMHG | OXYGEN SATURATION: 98 % | HEIGHT: 65 IN

## 2025-08-25 DIAGNOSIS — J30.89 ALLERGIC RHINITIS DUE TO OTHER ALLERGIC TRIGGER, UNSPECIFIED SEASONALITY: ICD-10-CM

## 2025-08-25 DIAGNOSIS — Z09 HOSPITAL DISCHARGE FOLLOW-UP: ICD-10-CM

## 2025-08-25 DIAGNOSIS — R73.03 PRE-DIABETES: Primary | ICD-10-CM

## 2025-08-25 DIAGNOSIS — G43.019 INTRACTABLE MIGRAINE WITHOUT AURA AND WITHOUT STATUS MIGRAINOSUS: ICD-10-CM

## 2025-08-25 DIAGNOSIS — I10 HYPERTENSION, UNSPECIFIED TYPE: ICD-10-CM

## 2025-08-25 DIAGNOSIS — E78.5 HYPERLIPIDEMIA, UNSPECIFIED HYPERLIPIDEMIA TYPE: ICD-10-CM

## 2025-08-25 DIAGNOSIS — M17.11 PRIMARY OSTEOARTHRITIS OF RIGHT KNEE: ICD-10-CM

## 2025-08-25 PROCEDURE — 3074F SYST BP LT 130 MM HG: CPT | Mod: CPTII,,, | Performed by: NURSE PRACTITIONER

## 2025-08-25 PROCEDURE — 3079F DIAST BP 80-89 MM HG: CPT | Mod: CPTII,,, | Performed by: NURSE PRACTITIONER

## 2025-08-25 PROCEDURE — 1159F MED LIST DOCD IN RCRD: CPT | Mod: CPTII,,, | Performed by: NURSE PRACTITIONER

## 2025-08-25 PROCEDURE — 1160F RVW MEDS BY RX/DR IN RCRD: CPT | Mod: CPTII,,, | Performed by: NURSE PRACTITIONER

## 2025-08-25 PROCEDURE — 99214 OFFICE O/P EST MOD 30 MIN: CPT | Mod: ,,, | Performed by: NURSE PRACTITIONER

## 2025-08-25 PROCEDURE — 3008F BODY MASS INDEX DOCD: CPT | Mod: CPTII,,, | Performed by: NURSE PRACTITIONER

## 2025-08-25 RX ORDER — PRAVASTATIN SODIUM 10 MG/1
10 TABLET ORAL DAILY
Qty: 90 TABLET | Refills: 1 | Status: SHIPPED | OUTPATIENT
Start: 2025-08-25

## 2025-08-25 RX ORDER — AMLODIPINE BESYLATE 10 MG/1
10 TABLET ORAL DAILY
Qty: 90 TABLET | Refills: 1 | Status: SHIPPED | OUTPATIENT
Start: 2025-08-25

## 2025-08-25 RX ORDER — ERGOCALCIFEROL 1.25 MG/1
50000 CAPSULE ORAL
Qty: 15 CAPSULE | Refills: 1 | Status: SHIPPED | OUTPATIENT
Start: 2025-08-25

## 2025-08-25 RX ORDER — FEXOFENADINE HCL 60 MG/1
60 TABLET, FILM COATED ORAL DAILY
Qty: 90 TABLET | Refills: 1 | Status: SHIPPED | OUTPATIENT
Start: 2025-08-25

## 2025-08-25 RX ORDER — ASPIRIN 81 MG/1
81 TABLET ORAL DAILY
Qty: 90 TABLET | Refills: 1 | Status: SHIPPED | OUTPATIENT
Start: 2025-08-25

## 2025-08-25 RX ORDER — MELOXICAM 15 MG/1
15 TABLET ORAL DAILY PRN
Qty: 90 TABLET | Refills: 1 | Status: SHIPPED | OUTPATIENT
Start: 2025-08-25

## (undated) DEVICE — GLOVE BIOGEL SKINSENSE PI 7.5

## (undated) DEVICE — GLOVE 6.5 PROTEXIS PI BLUE

## (undated) DEVICE — SHAVER TOMCAT 4.0

## (undated) DEVICE — GOWN POLY REINF BRTH SLV XL

## (undated) DEVICE — GLOVE 7.0 PROTEXIS PI BLUE

## (undated) DEVICE — GLOVE BIOGEL SKINSENSE PI 6.5

## (undated) DEVICE — TUBING CROSSFLOW INFLOW CASS

## (undated) DEVICE — GLOVE 7.5 PROTEXIS PI BLUE

## (undated) DEVICE — APPLICATOR CHLORAPREP ORN 26ML

## (undated) DEVICE — PROBE AARDVARK SUC 0.5X135MM

## (undated) DEVICE — TOURNIQUET SB QC SP 34X4IN

## (undated) DEVICE — GLOVE BIOGEL SKINSENSE PI 6.0

## (undated) DEVICE — BANDAGE PRCAR COMPR 11YDX6IN

## (undated) DEVICE — PACK KNEE ARTHROSCOPY  RUSH

## (undated) DEVICE — SOL NACL IRR 3000ML

## (undated) DEVICE — CANISTER SUCTION MEDI-VAC 12L

## (undated) DEVICE — PAD ABDOMINAL 8X7.5 STERILE